# Patient Record
Sex: FEMALE | Race: WHITE | NOT HISPANIC OR LATINO | Employment: OTHER | URBAN - NONMETROPOLITAN AREA
[De-identification: names, ages, dates, MRNs, and addresses within clinical notes are randomized per-mention and may not be internally consistent; named-entity substitution may affect disease eponyms.]

---

## 2017-05-15 ENCOUNTER — OFFICE VISIT (OUTPATIENT)
Dept: PULMONOLOGY | Facility: CLINIC | Age: 52
End: 2017-05-15

## 2017-05-15 VITALS
RESPIRATION RATE: 18 BRPM | WEIGHT: 250.2 LBS | HEART RATE: 85 BPM | OXYGEN SATURATION: 96 % | HEIGHT: 66 IN | SYSTOLIC BLOOD PRESSURE: 120 MMHG | DIASTOLIC BLOOD PRESSURE: 76 MMHG | BODY MASS INDEX: 40.21 KG/M2

## 2017-05-15 DIAGNOSIS — G47.33 OBSTRUCTIVE SLEEP APNEA: Primary | ICD-10-CM

## 2017-05-15 DIAGNOSIS — G47.19 EXCESSIVE DAYTIME SLEEPINESS: ICD-10-CM

## 2017-05-15 DIAGNOSIS — E66.9 OBESITY (BMI 30-39.9): ICD-10-CM

## 2017-05-15 DIAGNOSIS — R06.83 SNORING: ICD-10-CM

## 2017-05-15 PROCEDURE — 99244 OFF/OP CNSLTJ NEW/EST MOD 40: CPT | Performed by: INTERNAL MEDICINE

## 2017-05-15 RX ORDER — FLUTICASONE PROPIONATE 50 MCG
SPRAY, SUSPENSION (ML) NASAL
Refills: 2 | COMMUNITY
Start: 2017-05-09

## 2017-05-15 RX ORDER — TRIAMTERENE AND HYDROCHLOROTHIAZIDE 37.5; 25 MG/1; MG/1
CAPSULE ORAL
Refills: 3 | COMMUNITY
Start: 2017-03-01 | End: 2022-02-16

## 2017-05-15 RX ORDER — TRAZODONE HYDROCHLORIDE 100 MG/1
TABLET ORAL
Refills: 4 | COMMUNITY
Start: 2017-05-09 | End: 2022-11-01

## 2017-05-15 RX ORDER — MELOXICAM 15 MG/1
15 TABLET ORAL DAILY
COMMUNITY

## 2017-05-15 RX ORDER — RANITIDINE 150 MG/1
TABLET ORAL
Refills: 4 | COMMUNITY
Start: 2017-04-28 | End: 2022-02-16 | Stop reason: ALTCHOICE

## 2017-05-15 RX ORDER — CYCLOBENZAPRINE HCL 10 MG
10 TABLET ORAL 3 TIMES DAILY PRN
COMMUNITY
End: 2023-03-10

## 2017-05-15 RX ORDER — CETIRIZINE HYDROCHLORIDE 10 MG/1
TABLET ORAL
Refills: 2 | COMMUNITY
Start: 2017-05-11

## 2017-05-15 RX ORDER — AMOXICILLIN AND CLAVULANATE POTASSIUM 875; 125 MG/1; MG/1
TABLET, FILM COATED ORAL
Refills: 0 | COMMUNITY
Start: 2017-05-10 | End: 2017-06-21

## 2017-05-15 RX ORDER — PROPRANOLOL HYDROCHLORIDE 40 MG/1
40 TABLET ORAL 2 TIMES DAILY
COMMUNITY
Start: 2017-05-11 | End: 2022-02-16

## 2017-05-15 RX ORDER — FLUOXETINE HYDROCHLORIDE 40 MG/1
40 CAPSULE ORAL DAILY
COMMUNITY
End: 2023-03-10 | Stop reason: SDUPTHER

## 2017-05-16 ENCOUNTER — TRANSCRIBE ORDERS (OUTPATIENT)
Dept: GENERAL RADIOLOGY | Facility: HOSPITAL | Age: 52
End: 2017-05-16

## 2017-05-16 ENCOUNTER — APPOINTMENT (OUTPATIENT)
Dept: GENERAL RADIOLOGY | Facility: HOSPITAL | Age: 52
End: 2017-05-16

## 2017-05-16 ENCOUNTER — TELEPHONE (OUTPATIENT)
Dept: SURGERY | Facility: CLINIC | Age: 52
End: 2017-05-16

## 2017-05-16 DIAGNOSIS — J45.909 ACUTE ASTHMA: Primary | ICD-10-CM

## 2017-05-18 ENCOUNTER — PREP FOR SURGERY (OUTPATIENT)
Dept: SURGERY | Facility: CLINIC | Age: 52
End: 2017-05-18

## 2017-05-18 DIAGNOSIS — Z12.11 ENCOUNTER FOR SCREENING COLONOSCOPY: Primary | ICD-10-CM

## 2017-05-18 RX ORDER — SODIUM CHLORIDE 9 MG/ML
30 INJECTION, SOLUTION INTRAVENOUS CONTINUOUS PRN
Status: CANCELLED | OUTPATIENT
Start: 2017-05-18

## 2017-06-06 ENCOUNTER — APPOINTMENT (OUTPATIENT)
Dept: SLEEP MEDICINE | Facility: HOSPITAL | Age: 52
End: 2017-06-06
Attending: INTERNAL MEDICINE

## 2017-06-13 ENCOUNTER — HOSPITAL ENCOUNTER (OUTPATIENT)
Dept: SLEEP MEDICINE | Facility: HOSPITAL | Age: 52
Discharge: HOME | End: 2017-06-13
Attending: INTERNAL MEDICINE | Admitting: INTERNAL MEDICINE

## 2017-06-13 DIAGNOSIS — G47.19 EXCESSIVE DAYTIME SLEEPINESS: ICD-10-CM

## 2017-06-13 DIAGNOSIS — G47.33 OBSTRUCTIVE SLEEP APNEA: ICD-10-CM

## 2017-06-13 DIAGNOSIS — R06.83 SNORING: ICD-10-CM

## 2017-06-13 PROCEDURE — 95810 POLYSOM 6/> YRS 4/> PARAM: CPT | Performed by: INTERNAL MEDICINE

## 2017-06-13 PROCEDURE — 95810 POLYSOM 6/> YRS 4/> PARAM: CPT

## 2017-06-16 DIAGNOSIS — G47.33 OSA (OBSTRUCTIVE SLEEP APNEA): Primary | ICD-10-CM

## 2017-06-28 ENCOUNTER — HOSPITAL ENCOUNTER (OUTPATIENT)
Facility: HOSPITAL | Age: 52
Setting detail: HOSPITAL OUTPATIENT SURGERY
Discharge: HOME | End: 2017-06-28
Attending: SURGERY | Admitting: SURGERY

## 2017-06-28 ENCOUNTER — ANESTHESIA EVENT (OUTPATIENT)
Dept: GASTROENTEROLOGY | Facility: HOSPITAL | Age: 52
End: 2017-06-28

## 2017-06-28 ENCOUNTER — ANESTHESIA (OUTPATIENT)
Dept: GASTROENTEROLOGY | Facility: HOSPITAL | Age: 52
End: 2017-06-28

## 2017-06-28 VITALS
RESPIRATION RATE: 18 BRPM | TEMPERATURE: 98.4 F | BODY MASS INDEX: 40.18 KG/M2 | OXYGEN SATURATION: 98 % | HEART RATE: 62 BPM | SYSTOLIC BLOOD PRESSURE: 149 MMHG | HEIGHT: 66 IN | WEIGHT: 250 LBS | DIASTOLIC BLOOD PRESSURE: 67 MMHG

## 2017-06-28 DIAGNOSIS — Z12.11 ENCOUNTER FOR SCREENING COLONOSCOPY: ICD-10-CM

## 2017-06-28 LAB — GLUCOSE BLDC GLUCOMTR-MCNC: 141 MG/DL (ref 70–130)

## 2017-06-28 PROCEDURE — S0260 H&P FOR SURGERY: HCPCS | Performed by: SURGERY

## 2017-06-28 PROCEDURE — 25010000002 PROPOFOL 10 MG/ML EMULSION: Performed by: NURSE ANESTHETIST, CERTIFIED REGISTERED

## 2017-06-28 PROCEDURE — 25010000002 PROPOFOL 1000 MG/ML EMULSION: Performed by: NURSE ANESTHETIST, CERTIFIED REGISTERED

## 2017-06-28 PROCEDURE — 82962 GLUCOSE BLOOD TEST: CPT

## 2017-06-28 RX ORDER — PROPOFOL 10 MG/ML
VIAL (ML) INTRAVENOUS AS NEEDED
Status: DISCONTINUED | OUTPATIENT
Start: 2017-06-28 | End: 2017-06-28 | Stop reason: SURG

## 2017-06-28 RX ORDER — KETAMINE HYDROCHLORIDE 50 MG/ML
INJECTION, SOLUTION, CONCENTRATE INTRAMUSCULAR; INTRAVENOUS AS NEEDED
Status: DISCONTINUED | OUTPATIENT
Start: 2017-06-28 | End: 2017-06-28 | Stop reason: SURG

## 2017-06-28 RX ORDER — LIDOCAINE HYDROCHLORIDE 20 MG/ML
INJECTION, SOLUTION INFILTRATION; PERINEURAL AS NEEDED
Status: DISCONTINUED | OUTPATIENT
Start: 2017-06-28 | End: 2017-06-28 | Stop reason: SURG

## 2017-06-28 RX ORDER — SODIUM CHLORIDE 9 MG/ML
30 INJECTION, SOLUTION INTRAVENOUS CONTINUOUS PRN
Status: DISCONTINUED | OUTPATIENT
Start: 2017-06-28 | End: 2017-06-28 | Stop reason: HOSPADM

## 2017-06-28 RX ORDER — MONTELUKAST SODIUM 10 MG/1
10 TABLET ORAL NIGHTLY
COMMUNITY

## 2017-06-28 RX ORDER — ONDANSETRON 2 MG/ML
4 INJECTION INTRAMUSCULAR; INTRAVENOUS ONCE AS NEEDED
Status: DISCONTINUED | OUTPATIENT
Start: 2017-06-28 | End: 2017-06-28 | Stop reason: HOSPADM

## 2017-06-28 RX ADMIN — KETAMINE HYDROCHLORIDE 25 MG: 50 INJECTION, SOLUTION INTRAMUSCULAR; INTRAVENOUS at 08:07

## 2017-06-28 RX ADMIN — SODIUM CHLORIDE 30 ML/HR: 9 INJECTION, SOLUTION INTRAVENOUS at 07:02

## 2017-06-28 RX ADMIN — PROPOFOL 150 MG: 10 INJECTION, EMULSION INTRAVENOUS at 08:10

## 2017-06-28 RX ADMIN — PROPOFOL 100 MCG/KG/MIN: 10 INJECTION, EMULSION INTRAVENOUS at 08:10

## 2017-06-28 RX ADMIN — LIDOCAINE HYDROCHLORIDE 2 ML: 20 INJECTION, SOLUTION INFILTRATION; PERINEURAL at 08:07

## 2017-06-28 RX ADMIN — SODIUM CHLORIDE: 9 INJECTION, SOLUTION INTRAVENOUS at 08:04

## 2017-06-28 NOTE — ANESTHESIA PREPROCEDURE EVALUATION
Anesthesia Evaluation     Patient summary reviewed and Nursing notes reviewed   NPO Solid Status: > 8 hours  NPO Liquid Status: > 8 hours     Airway   Mallampati: I  TM distance: >3 FB  Neck ROM: full  no difficulty expected  Dental      Pulmonary - normal exam   (+) sleep apnea,   (-) asthma  Cardiovascular - normal exam    (+) hypertension,       Neuro/Psych  (+) psychiatric history Depression,    GI/Hepatic/Renal/Endo    (+) morbid obesity, GERD, diabetes mellitus type 2,     Musculoskeletal     Abdominal   (+) obese,    Substance History      OB/GYN          Other                                        Anesthesia Plan    ASA 3     MAC     intravenous induction   Anesthetic plan and risks discussed with patient.

## 2017-06-28 NOTE — ANESTHESIA POSTPROCEDURE EVALUATION
Patient: Ana Samaniego    Procedure Summary     Date Anesthesia Start Anesthesia Stop Room / Location    06/28/17 0804 [id number] [facility] ENDOSCOPY 3 / [facility] ENDOSCOPY       Procedure Diagnosis Surgeon Provider    COLONOSCOPY (N/A Anus) Encounter for screening colonoscopy  (Encounter for screening colonoscopy [Z12.11]) MD Darrick Villalta CRNA          Anesthesia Type: MAC  Last vitals  BP      Temp      Pulse     Resp      SpO2        Post Anesthesia Care and Evaluation    Patient location during evaluation: PACU  Patient participation: complete - patient participated  Level of consciousness: awake and alert  Pain score: 0  Pain management: satisfactory to patient  Airway patency: patent  Anesthetic complications: No anesthetic complications  PONV Status: none  Cardiovascular status: acceptable and hemodynamically stable  Respiratory status: acceptable  Hydration status: acceptable

## 2018-04-03 ENCOUNTER — TRANSCRIBE ORDERS (OUTPATIENT)
Dept: ADMINISTRATIVE | Facility: HOSPITAL | Age: 53
End: 2018-04-03

## 2018-04-03 DIAGNOSIS — Z12.39 SCREENING BREAST EXAMINATION: Primary | ICD-10-CM

## 2018-05-07 ENCOUNTER — HOSPITAL ENCOUNTER (OUTPATIENT)
Dept: MAMMOGRAPHY | Facility: HOSPITAL | Age: 53
Discharge: HOME | End: 2018-05-07
Admitting: PHYSICIAN ASSISTANT

## 2018-05-07 ENCOUNTER — TELEPHONE (OUTPATIENT)
Dept: MAMMOGRAPHY | Facility: HOSPITAL | Age: 53
End: 2018-05-07

## 2018-05-07 DIAGNOSIS — Z12.39 SCREENING BREAST EXAMINATION: ICD-10-CM

## 2018-05-07 PROCEDURE — 77067 SCR MAMMO BI INCL CAD: CPT

## 2018-05-07 PROCEDURE — 77063 BREAST TOMOSYNTHESIS BI: CPT

## 2019-08-13 ENCOUNTER — TRANSCRIBE ORDERS (OUTPATIENT)
Dept: ADMINISTRATIVE | Facility: HOSPITAL | Age: 54
End: 2019-08-13

## 2019-08-13 DIAGNOSIS — Z12.39 ENCOUNTER FOR SCREENING FOR MALIGNANT NEOPLASM OF BREAST: Primary | ICD-10-CM

## 2019-08-30 ENCOUNTER — HOSPITAL ENCOUNTER (OUTPATIENT)
Dept: MAMMOGRAPHY | Facility: HOSPITAL | Age: 54
Discharge: HOME | End: 2019-08-30
Admitting: PHYSICIAN ASSISTANT

## 2019-08-30 DIAGNOSIS — Z12.39 ENCOUNTER FOR SCREENING FOR MALIGNANT NEOPLASM OF BREAST: ICD-10-CM

## 2019-08-30 PROCEDURE — 77063 BREAST TOMOSYNTHESIS BI: CPT

## 2019-08-30 PROCEDURE — 77067 SCR MAMMO BI INCL CAD: CPT

## 2020-01-08 DIAGNOSIS — M25.562 ARTHRALGIA OF LEFT KNEE: Primary | ICD-10-CM

## 2020-01-09 ENCOUNTER — OFFICE VISIT (OUTPATIENT)
Dept: ORTHOPEDIC SURGERY | Facility: CLINIC | Age: 55
End: 2020-01-09

## 2020-01-09 VITALS — HEIGHT: 66 IN | RESPIRATION RATE: 18 BRPM | BODY MASS INDEX: 40.18 KG/M2 | WEIGHT: 250 LBS

## 2020-01-09 DIAGNOSIS — M17.10 ARTHRITIS OF KNEE: Primary | ICD-10-CM

## 2020-01-09 PROCEDURE — 20610 DRAIN/INJ JOINT/BURSA W/O US: CPT | Performed by: ORTHOPAEDIC SURGERY

## 2020-01-09 PROCEDURE — 99204 OFFICE O/P NEW MOD 45 MIN: CPT | Performed by: ORTHOPAEDIC SURGERY

## 2020-01-09 RX ORDER — TRIAMCINOLONE ACETONIDE 40 MG/ML
40 INJECTION, SUSPENSION INTRA-ARTICULAR; INTRAMUSCULAR
Status: COMPLETED | OUTPATIENT
Start: 2020-01-09 | End: 2020-01-09

## 2020-01-09 RX ORDER — LIDOCAINE HYDROCHLORIDE 10 MG/ML
2 INJECTION, SOLUTION INFILTRATION; PERINEURAL
Status: COMPLETED | OUTPATIENT
Start: 2020-01-09 | End: 2020-01-09

## 2020-01-09 RX ADMIN — LIDOCAINE HYDROCHLORIDE 2 ML: 10 INJECTION, SOLUTION INFILTRATION; PERINEURAL at 08:54

## 2020-01-09 RX ADMIN — TRIAMCINOLONE ACETONIDE 40 MG: 40 INJECTION, SUSPENSION INTRA-ARTICULAR; INTRAMUSCULAR at 08:54

## 2020-01-09 NOTE — PROGRESS NOTES
Subjective   Patient ID: Ana Samaniego is a 54 y.o. female  Pain of the Left Knee (Patient is here today for left knee pain, she states the pain has been going on for about 3 months, and states her pcp gave her a cortisone injection in her hip.)             History of Present Illness  54-year-old with chronic medial left knee pain worse the last month no fall or injury, minimally improved with ibuprofen, no history of surgery or injections to the left knee, had injection in her left hip a month ago which relieved her left hip pain.  Does care for 3 grandchildren at home frequently walks up and down stairs otherwise no locking giving way or other significant acute medical illness.      Review of Systems   Constitutional: Negative for fever.   HENT: Negative for dental problem and voice change.    Eyes: Negative for visual disturbance.   Respiratory: Negative for shortness of breath.    Cardiovascular: Negative for chest pain.   Gastrointestinal: Negative for abdominal pain.   Genitourinary: Negative for dysuria.   Musculoskeletal: Positive for arthralgias. Negative for gait problem and joint swelling.   Skin: Negative for rash.   Neurological: Negative for speech difficulty.   Hematological: Does not bruise/bleed easily.   Psychiatric/Behavioral: Negative for confusion.       Past Medical History:   Diagnosis Date   • Acid reflux    • Asthma, extrinsic    • Bronchitis    • Depression    • Diabetes (CMS/HCC)    • GERD (gastroesophageal reflux disease)    • Hypertension    • Pleurisy         Past Surgical History:   Procedure Laterality Date   • COLONOSCOPY N/A 6/28/2017    Procedure: COLONOSCOPY;  Surgeon: Abdi Del Castillo MD;  Location: [facility] ENDOSCOPY;  Service:    • GALLBLADDER SURGERY     • HYSTERECTOMY     • RECTOVAGINAL FISTULA REPAIR     • TUBAL ABDOMINAL LIGATION     • VEIN LIGATION AND STRIPPING Right        Family History   Problem Relation Age of Onset   • Heart attack Mother    • Emphysema Father         Social History     Socioeconomic History   • Marital status:      Spouse name: Not on file   • Number of children: Not on file   • Years of education: Not on file   • Highest education level: Not on file   Tobacco Use   • Smoking status: Former Smoker     Packs/day: 1.00     Years: 20.00     Pack years: 20.00     Types: Cigarettes     Last attempt to quit: 2012     Years since quittin.5   • Smokeless tobacco: Never Used   Substance and Sexual Activity   • Alcohol use: No   • Drug use: No   • Sexual activity: Defer       I have reviewed the above medical and surgical history, family history, social history, medications, allergies and review of systems.    No Known Allergies      Current Outpatient Medications:   •  Calcium Carbonate-Vitamin D (CALCIUM PLUS VITAMIN D PO), Take 1 tablet by mouth Daily., Disp: , Rfl:   •  cetirizine (zyrTEC) 10 MG tablet, TAKE 1 TABLET BY MOUTH ONE TIME A DAY, Disp: , Rfl: 2  •  cyclobenzaprine (FLEXERIL) 10 MG tablet, Take 10 mg by mouth 3 (Three) Times a Day As Needed for Muscle Spasms., Disp: , Rfl:   •  FLUoxetine (PROzac) 40 MG capsule, Take 40 mg by mouth Daily., Disp: , Rfl:   •  fluticasone (FLONASE) 50 MCG/ACT nasal spray, INHALE 1 SPRAY IN EACH NOSTRIL ONE TIME A DAY-USES PRN, Disp: , Rfl: 2  •  meloxicam (MOBIC) 15 MG tablet, Take 15 mg by mouth Daily., Disp: , Rfl:   •  montelukast (SINGULAIR) 10 MG tablet, Take 10 mg by mouth Every Night., Disp: , Rfl:   •  PROAIR  (90 BASE) MCG/ACT inhaler, INHALE 2 PUFFS BY MOUTH FOUR TIMES A DAY AS NEEDED, Disp: , Rfl: 2  •  propranolol (INDERAL) 40 MG tablet, 40 mg 2 (Two) Times a Day., Disp: , Rfl:   •  raNITIdine (ZANTAC) 150 MG tablet, TAKE 1 TABLET BY MOUTH ONE TIME A DAY, Disp: , Rfl: 4  •  traZODone (DESYREL) 100 MG tablet, TAKE 1 TABLET BY MOUTH AT BEDTIME, Disp: , Rfl: 4  •  triamterene-hydrochlorothiazide (DYAZIDE) 37.5-25 MG per capsule, TAKE 1 CAPSULE BY MOUTH ONE TIME A DAY, Disp: , Rfl:  "3    Objective   Resp 18   Ht 167.6 cm (66\")   Wt 113 kg (250 lb)   BMI 40.35 kg/m²    Physical Exam  Constitutional: Patient is oriented to person, place, and time. Patient appears well-developed and well-nourished.   HENT:Head: Normocephalic and atraumatic.   Eyes: EOM are normal. Pupils are equal, round, and reactive to light.   Neck: Normal range of motion. Neck supple.   Cardiovascular: Normal rate.    Pulmonary/Chest: Effort normal and breath sounds normal.   Abdominal: Soft.   Neurological: Patient is alert and oriented to person, place, and time.   Skin: Skin is warm and dry.   Psychiatric: Patient has a normal mood and affect.   Nursing note and vitals reviewed.       [unfilled]   Left knee: Point tenderness over the anteromedial joint line no erythema warmth or significant instability, full extension minimal patellofemoral crepitus no lateral joint line pain calf supple neurovascularly intact.  Alignment neutral full range of motion Abad sign negative for mechanical posterior medial joint line pain.    Assessment/Plan   Review of Radiographic Studies:    Indication to evaluate joint condition, no comparison views available, shows evident chronic advanced osteoarthritis.      Large Joint Arthrocentesis: L knee  Date/Time: 1/9/2020 8:54 AM  Consent given by: patient  Site marked: site marked  Timeout: Immediately prior to procedure a time out was called to verify the correct patient, procedure, equipment, support staff and site/side marked as required   Supporting Documentation  Indications: pain   Procedure Details  Location: knee - L knee  Preparation: Patient was prepped and draped in the usual sterile fashion  Needle size: 22 G  Approach: anterolateral  Medications administered: 40 mg triamcinolone acetonide 40 MG/ML; 2 mL lidocaine 1 %  Patient tolerance: patient tolerated the procedure well with no immediate complications           Ana was seen today for pain.    Diagnoses and all orders for " this visit:    Arthritis of knee  -     Large Joint Arthrocentesis: L knee       Orthopedic activities reviewed and patient expressed appreciation, Risk, benefits, and merits of treatment alternatives reviewed with the patient and questions answered and Using illustrations and models, the nature of the pathology was explained to the patient      Recommendations/Plan:   Work/Activity Status: May perform usual activities as tolerated    Patient agreeable to call or return sooner for any concerns.         I reviewed the patient's radiographs indicating advanced osteoarthritis discussed the natural history treatment options and pros and cons and risks and complications of surgical and nonsurgical care.  I also reviewed advantages and disadvantages risks and complications of steroid injections versus viscus gel injections.  The patient had opportunity to ask questions which were answered.    Impression:  Left knee osteoarthritis  Plan:  Weight loss home exercise return for further injections as needed continue with ibuprofen with PCP direction

## 2021-12-08 ENCOUNTER — TRANSCRIBE ORDERS (OUTPATIENT)
Dept: ADMINISTRATIVE | Facility: HOSPITAL | Age: 56
End: 2021-12-08

## 2021-12-08 DIAGNOSIS — Z13.820 ENCOUNTER FOR SCREENING FOR OSTEOPOROSIS: Primary | ICD-10-CM

## 2021-12-08 DIAGNOSIS — Z12.31 ENCOUNTER FOR SCREENING MAMMOGRAM FOR MALIGNANT NEOPLASM OF BREAST: ICD-10-CM

## 2021-12-08 DIAGNOSIS — Z12.31 ENCOUNTER FOR SCREENING MAMMOGRAM FOR MALIGNANT NEOPLASM OF BREAST: Primary | ICD-10-CM

## 2022-02-01 ENCOUNTER — APPOINTMENT (OUTPATIENT)
Dept: BONE DENSITY | Facility: HOSPITAL | Age: 57
End: 2022-02-01

## 2022-02-01 ENCOUNTER — HOSPITAL ENCOUNTER (OUTPATIENT)
Dept: MAMMOGRAPHY | Facility: HOSPITAL | Age: 57
Discharge: HOME | End: 2022-02-01

## 2022-02-01 DIAGNOSIS — Z13.820 ENCOUNTER FOR SCREENING FOR OSTEOPOROSIS: ICD-10-CM

## 2022-02-01 DIAGNOSIS — Z12.31 ENCOUNTER FOR SCREENING MAMMOGRAM FOR MALIGNANT NEOPLASM OF BREAST: ICD-10-CM

## 2022-02-01 PROCEDURE — 77067 SCR MAMMO BI INCL CAD: CPT

## 2022-02-01 PROCEDURE — 77063 BREAST TOMOSYNTHESIS BI: CPT

## 2022-02-01 PROCEDURE — 77080 DXA BONE DENSITY AXIAL: CPT

## 2022-02-16 ENCOUNTER — OFFICE VISIT (OUTPATIENT)
Dept: OBSTETRICS AND GYNECOLOGY | Facility: CLINIC | Age: 57
End: 2022-02-16

## 2022-02-16 VITALS
SYSTOLIC BLOOD PRESSURE: 128 MMHG | WEIGHT: 258 LBS | BODY MASS INDEX: 41.46 KG/M2 | DIASTOLIC BLOOD PRESSURE: 80 MMHG | HEIGHT: 66 IN

## 2022-02-16 DIAGNOSIS — Z01.419 SMEAR, VAGINAL, AS PART OF ROUTINE GYNECOLOGICAL EXAMINATION: ICD-10-CM

## 2022-02-16 DIAGNOSIS — Z12.72 SMEAR, VAGINAL, AS PART OF ROUTINE GYNECOLOGICAL EXAMINATION: ICD-10-CM

## 2022-02-16 DIAGNOSIS — Z01.419 ENCOUNTER FOR GYNECOLOGICAL EXAMINATION WITHOUT ABNORMAL FINDING: Primary | ICD-10-CM

## 2022-02-16 PROCEDURE — 99386 PREV VISIT NEW AGE 40-64: CPT | Performed by: PHYSICIAN ASSISTANT

## 2022-02-16 RX ORDER — AMLODIPINE BESYLATE 10 MG/1
TABLET ORAL
COMMUNITY
Start: 2022-02-15

## 2022-02-16 RX ORDER — DICLOFENAC SODIUM 75 MG/1
75 TABLET, DELAYED RELEASE ORAL 2 TIMES DAILY
COMMUNITY
Start: 2022-02-06

## 2022-02-16 RX ORDER — TRAZODONE HYDROCHLORIDE 150 MG/1
150 TABLET ORAL
COMMUNITY
Start: 2021-12-16

## 2022-02-16 RX ORDER — CLONAZEPAM 0.5 MG/1
0.5 TABLET ORAL 2 TIMES DAILY
COMMUNITY
Start: 2022-01-19 | End: 2023-01-13 | Stop reason: SDUPTHER

## 2022-02-16 RX ORDER — METHOCARBAMOL 750 MG/1
750 TABLET, FILM COATED ORAL NIGHTLY
COMMUNITY
Start: 2022-02-06

## 2022-02-16 RX ORDER — OMEPRAZOLE 20 MG/1
20 CAPSULE, DELAYED RELEASE ORAL EVERY MORNING
COMMUNITY
Start: 2022-01-18

## 2022-02-16 NOTE — PROGRESS NOTES
Subjective   Chief Complaint   Patient presents with   • Gynecologic Exam     Last pap done in 2019-WNL, MMG and DEXA done on 22, No complaints       Ana Samaniego is a 56 y.o. year old  presenting to be seen for her annual gynecological exam.   She has no complaints or concerns  She had a previous hysterectomy by Dr. Stark several years ago for bleeding issues.  She has both ovaries remaining  She had a normal screening mammogram in February as well as a normal DEXA.  He is taking calcium with vitamin D supplement daily    Past Medical History:   Diagnosis Date   • Abnormal Pap smear of cervix    • Acid reflux    • Anxiety    • Asthma, extrinsic    • Bronchitis    • Depression    • Diabetes (HCC)    • GERD (gastroesophageal reflux disease)    • Gestational diabetes    • Gestational hypertension    • HPV (human papilloma virus) infection    • Hypertension    • Pleurisy         Current Outpatient Medications:   •  amLODIPine (NORVASC) 10 MG tablet, , Disp: , Rfl:   •  Calcium Carbonate-Vitamin D (CALCIUM PLUS VITAMIN D PO), Take 1 tablet by mouth Daily., Disp: , Rfl:   •  cetirizine (zyrTEC) 10 MG tablet, TAKE 1 TABLET BY MOUTH ONE TIME A DAY, Disp: , Rfl: 2  •  clonazePAM (KlonoPIN) 0.5 MG tablet, Take 0.5 mg by mouth 2 (Two) Times a Day., Disp: , Rfl:   •  cyclobenzaprine (FLEXERIL) 10 MG tablet, Take 10 mg by mouth 3 (Three) Times a Day As Needed for Muscle Spasms., Disp: , Rfl:   •  diclofenac (VOLTAREN) 75 MG EC tablet, Take 75 mg by mouth 2 (Two) Times a Day., Disp: , Rfl:   •  FLUoxetine (PROzac) 40 MG capsule, Take 40 mg by mouth Daily., Disp: , Rfl:   •  fluticasone (FLONASE) 50 MCG/ACT nasal spray, INHALE 1 SPRAY IN EACH NOSTRIL ONE TIME A DAY-USES PRN, Disp: , Rfl: 2  •  meloxicam (MOBIC) 15 MG tablet, Take 15 mg by mouth Daily., Disp: , Rfl:   •  methocarbamol (ROBAXIN) 750 MG tablet, Take 750 mg by mouth Every Night., Disp: , Rfl:   •  montelukast (SINGULAIR) 10 MG tablet, Take 10 mg by  "mouth Every Night., Disp: , Rfl:   •  omeprazole (priLOSEC) 20 MG capsule, Take 20 mg by mouth Every Morning., Disp: , Rfl:   •  PROAIR  (90 BASE) MCG/ACT inhaler, INHALE 2 PUFFS BY MOUTH FOUR TIMES A DAY AS NEEDED, Disp: , Rfl: 2  •  traZODone (DESYREL) 100 MG tablet, TAKE 1 TABLET BY MOUTH AT BEDTIME, Disp: , Rfl: 4  •  traZODone (DESYREL) 150 MG tablet, Take 150 mg by mouth every night at bedtime., Disp: , Rfl:    No Known Allergies   Past Surgical History:   Procedure Laterality Date   • COLONOSCOPY N/A 2017    Procedure: COLONOSCOPY;  Surgeon: Abdi Del Castillo MD;  Location: [facility] ENDOSCOPY;  Service:    • GALLBLADDER SURGERY     • HYSTERECTOMY     • RECTOVAGINAL FISTULA REPAIR     • TUBAL ABDOMINAL LIGATION     • VEIN LIGATION AND STRIPPING Right       Social History     Socioeconomic History   • Marital status:    Tobacco Use   • Smoking status: Former Smoker     Packs/day: 1.00     Years: 20.00     Pack years: 20.00     Types: Cigarettes     Quit date: 2012     Years since quittin.6   • Smokeless tobacco: Never Used   Vaping Use   • Vaping Use: Never used   Substance and Sexual Activity   • Alcohol use: No   • Drug use: No   • Sexual activity: Not Currently     Partners: Male     Birth control/protection: Surgical      Family History   Problem Relation Age of Onset   • Heart attack Mother    • Emphysema Father        Review of Systems   Constitutional: Negative for chills, diaphoresis and fever.   Gastrointestinal: Negative.    Genitourinary: Negative for difficulty urinating, dysuria and pelvic pain.   Psychiatric/Behavioral: Positive for dysphoric mood.           Objective   /80   Ht 167.6 cm (66\")   Wt 117 kg (258 lb)   Breastfeeding No   BMI 41.64 kg/m²     Physical Exam  Constitutional:       Appearance: Normal appearance. She is well-developed and well-groomed.   Eyes:      General: Lids are normal.      Extraocular Movements: Extraocular movements intact.      " Conjunctiva/sclera: Conjunctivae normal.   Chest:   Breasts: Breasts are symmetrical.      Right: No inverted nipple, mass, nipple discharge, skin change, tenderness or axillary adenopathy.      Left: No inverted nipple, mass, nipple discharge, skin change, tenderness or axillary adenopathy.       Abdominal:      Palpations: Abdomen is soft.      Tenderness: There is no abdominal tenderness.   Genitourinary:     Labia:         Right: No rash, tenderness or lesion.         Left: No rash, tenderness or lesion.       Urethra: No prolapse, urethral pain, urethral swelling or urethral lesion.      Vagina: No vaginal discharge, tenderness or lesions.      Uterus: Absent.       Adnexa:         Right: No mass or tenderness.          Left: No mass or tenderness.     Lymphadenopathy:      Upper Body:      Right upper body: No axillary adenopathy.      Left upper body: No axillary adenopathy.   Skin:     General: Skin is warm and dry.      Findings: No bruising or lesion.   Neurological:      Mental Status: She is alert.   Psychiatric:         Attention and Perception: Attention normal.         Mood and Affect: Mood normal.         Speech: Speech normal.         Behavior: Behavior is cooperative.            Result Review :                   Assessment and Plan  Diagnoses and all orders for this visit:    1. Encounter for gynecological examination without abnormal finding (Primary)    2. Smear, vaginal, as part of routine gynecological examination  -     Pap IG, HPV-hr; Future      Patient Instructions   Self breast exam monthly  Annual mammogram  Calcium 1200 mg daily and vit D 2000 mg daily  Regular excercise               This note was electronically signed.    Sailaja Heller PA-C   February 16, 2022

## 2022-03-04 DIAGNOSIS — Z01.419 SMEAR, VAGINAL, AS PART OF ROUTINE GYNECOLOGICAL EXAMINATION: ICD-10-CM

## 2022-03-04 DIAGNOSIS — Z12.72 SMEAR, VAGINAL, AS PART OF ROUTINE GYNECOLOGICAL EXAMINATION: ICD-10-CM

## 2022-11-01 ENCOUNTER — OFFICE VISIT (OUTPATIENT)
Dept: PSYCHIATRY | Facility: CLINIC | Age: 57
End: 2022-11-01

## 2022-11-01 VITALS
WEIGHT: 235 LBS | SYSTOLIC BLOOD PRESSURE: 112 MMHG | BODY MASS INDEX: 37.77 KG/M2 | DIASTOLIC BLOOD PRESSURE: 72 MMHG | HEIGHT: 66 IN

## 2022-11-01 DIAGNOSIS — F33.1 MODERATE EPISODE OF RECURRENT MAJOR DEPRESSIVE DISORDER: Primary | ICD-10-CM

## 2022-11-01 DIAGNOSIS — F41.1 GENERALIZED ANXIETY DISORDER: ICD-10-CM

## 2022-11-01 DIAGNOSIS — F41.0 PANIC ATTACKS: ICD-10-CM

## 2022-11-01 PROCEDURE — 90792 PSYCH DIAG EVAL W/MED SRVCS: CPT | Performed by: NURSE PRACTITIONER

## 2022-11-01 RX ORDER — SITAGLIPTIN AND METFORMIN HYDROCHLORIDE 50; 500 MG/1; MG/1
1 TABLET, FILM COATED, EXTENDED RELEASE ORAL DAILY
COMMUNITY

## 2022-11-01 RX ORDER — HYDROXYZINE HYDROCHLORIDE 25 MG/1
25 TABLET, FILM COATED ORAL 3 TIMES DAILY PRN
COMMUNITY

## 2022-11-01 RX ORDER — LORATADINE 10 MG/1
10 CAPSULE, LIQUID FILLED ORAL
COMMUNITY

## 2022-11-01 RX ORDER — ARIPIPRAZOLE 5 MG/1
5 TABLET ORAL DAILY
Qty: 30 TABLET | Refills: 1 | Status: SHIPPED | OUTPATIENT
Start: 2022-11-01 | End: 2022-12-13 | Stop reason: SDUPTHER

## 2022-11-01 RX ORDER — DULOXETIN HYDROCHLORIDE 30 MG/1
30 CAPSULE, DELAYED RELEASE ORAL DAILY
COMMUNITY
End: 2022-12-13 | Stop reason: ALTCHOICE

## 2022-11-01 RX ORDER — ARIPIPRAZOLE 5 MG/1
5 TABLET ORAL DAILY
Qty: 30 TABLET | Refills: 1 | Status: SHIPPED | OUTPATIENT
Start: 2022-11-01 | End: 2022-11-01

## 2022-11-01 RX ORDER — CALCIUM CARBONATE 200(500)MG
1 TABLET,CHEWABLE ORAL DAILY
COMMUNITY

## 2022-11-01 NOTE — PROGRESS NOTES
Subjective   Ana Samaniego is a 57 y.o. female who presents today for initial evaluation     Chief Complaint: Depression, anxiety and panic attacks    History of Present Illness:   History of Present Illness  Ana Samaniego presents to [facility] for initial evaluation.  Reports history of anxiety and depression that has been present for several years.  Symptoms have most recently been treated by PCP, Terese Camacho PA-C.  Admits that symptoms have become progressively worse over the past few months.  Reports recent episode of severe anxiety, with increased frequency and severity of panic attacks as well.  Reports depressive symptoms that include very little to no motivation, no interest in activities, fatigue, poor appetite, poor concentration and poor sleep.  Says that anxiety is constant, often worries and feels on edge.  Panic attacks occur multiple times a week, sometimes daily or multiple times per day.  Symptoms with panic episode include chest pressure/tightness, SOA and fidgetiness.  Reports most recent incident of extreme anxiety occurred after being out of clonazepam x5 days.  Says that she typically likes to keep her house tidy and in order, but has most recently found it difficult to even wash dishes, decorate for holidays or do any of her daily house chores.  Trazodone was recently increased by PCP to help with sleep.  Currently obtaining about 8 hours of sleep per night.  Reports appetite has been decreased due medication (Trulicity).  Says that this medication was recently stopped and hopes to start another type of injection for diabetes.  Adamantly denies any current SI/HI or AV hallucinations. PHQ-9 total score: 12, ES-7 total score: 14    Past Psychiatric History: Reports history of anxiety and depression that was initially diagnosed around 2012.  Admits past suicide attempt in 2012 and was hospitalized x1 week in [place].  Verbalizes that  increase in symptoms occurred after finding out about her first 's affair in 2012.  An affair    Previous Psych Meds: Reports having tried several past psychiatric medications, but verbalizes she does not recall specific names.    Past Medical History: Has other past medical history of diabetes and hypertension    Social History: Lives with  of 4 years in [place].  Says that she and her  have been together for a total of 10 years.  She has 3 grown children by a previous marriage not currently living in the home.  Reports close relationship with her daughter.     The following portions of the patient's history were reviewed and updated as appropriate: allergies, current medications, past family history, past medical history, past social history, past surgical history and problem list.      Past Medical History:  Past Medical History:   Diagnosis Date   • Abnormal Pap smear of cervix    • Acid reflux    • Anxiety    • Asthma, extrinsic    • Bronchitis    • Depression    • Diabetes (HCC)    • GERD (gastroesophageal reflux disease)    • Gestational diabetes    • Gestational hypertension    • HPV (human papilloma virus) infection    • Hypertension    • Pleurisy        Social History:  Social History     Socioeconomic History   • Marital status:    Tobacco Use   • Smoking status: Former     Packs/day: 1.00     Years: 20.00     Pack years: 20.00     Types: Cigarettes     Quit date: 6/21/2012     Years since quitting: 10.3   • Smokeless tobacco: Never   Vaping Use   • Vaping Use: Never used   Substance and Sexual Activity   • Alcohol use: No   • Drug use: No   • Sexual activity: Not Currently     Partners: Male     Birth control/protection: Surgical       Family History:  Family History   Problem Relation Age of Onset   • Heart attack Mother    • Emphysema Father        Past Surgical History:  Past Surgical History:   Procedure Laterality Date   • COLONOSCOPY N/A 6/28/2017    Procedure:  COLONOSCOPY;  Surgeon: Abdi Del Castillo MD;  Location: [facility] ENDOSCOPY;  Service:    • GALLBLADDER SURGERY     • HYSTERECTOMY     • RECTOVAGINAL FISTULA REPAIR     • TUBAL ABDOMINAL LIGATION     • VEIN LIGATION AND STRIPPING Right        Problem List:  There is no problem list on file for this patient.      Allergy:   No Known Allergies     Current Medications:   Current Outpatient Medications   Medication Sig Dispense Refill   • amLODIPine (NORVASC) 10 MG tablet      • ARIPiprazole (ABILIFY) 5 MG tablet Take 1 tablet by mouth Daily for 60 days. 30 tablet 1   • calcium carbonate (TUMS) 500 MG chewable tablet Chew 1 tablet Daily.     • Calcium Carbonate-Vitamin D (CALCIUM PLUS VITAMIN D PO) Take 1 tablet by mouth Daily.     • cetirizine (zyrTEC) 10 MG tablet TAKE 1 TABLET BY MOUTH ONE TIME A DAY  2   • clonazePAM (KlonoPIN) 0.5 MG tablet Take 0.5 mg by mouth 2 (Two) Times a Day.     • cyclobenzaprine (FLEXERIL) 10 MG tablet Take 10 mg by mouth 3 (Three) Times a Day As Needed for Muscle Spasms.     • diclofenac (VOLTAREN) 75 MG EC tablet Take 75 mg by mouth 2 (Two) Times a Day.     • DULoxetine (CYMBALTA) 30 MG capsule Take 1 capsule by mouth Daily.     • FLUoxetine (PROzac) 40 MG capsule Take 40 mg by mouth Daily.     • fluticasone (FLONASE) 50 MCG/ACT nasal spray INHALE 1 SPRAY IN EACH NOSTRIL ONE TIME A DAY-USES PRN  2   • hydrOXYzine (ATARAX) 25 MG tablet Take 1 tablet by mouth 3 (Three) Times a Day As Needed for Itching.     • Loratadine 10 MG capsule Take 10 capsules by mouth.     • meloxicam (MOBIC) 15 MG tablet Take 15 mg by mouth Daily.     • methocarbamol (ROBAXIN) 750 MG tablet Take 750 mg by mouth Every Night.     • montelukast (SINGULAIR) 10 MG tablet Take 10 mg by mouth Every Night.     • Multiple Vitamins-Minerals (b complex-C-E-zinc) tablet Take 1 tablet by mouth Daily.     • omeprazole (priLOSEC) 20 MG capsule Take 20 mg by mouth Every Morning.     • PROAIR  (90 BASE) MCG/ACT inhaler INHALE 2  "PUFFS BY MOUTH FOUR TIMES A DAY AS NEEDED  2   • SITagliptin-metFORMIN HCl ER (Janumet XR)  MG tablet Take 1 tablet by mouth Daily.     • traZODone (DESYREL) 150 MG tablet Take 150 mg by mouth every night at bedtime.       No current facility-administered medications for this visit.       Review of Symptoms:    Review of Systems   Constitutional: Positive for fatigue. Negative for activity change, appetite change, unexpected weight gain and unexpected weight loss.   Respiratory: Negative for shortness of breath.    Cardiovascular: Negative for chest pain.   Psychiatric/Behavioral: Positive for decreased concentration, sleep disturbance, depressed mood and stress. Negative for hallucinations and suicidal ideas. The patient is nervous/anxious.      Physical Exam:   Physical Exam  Vitals reviewed.   Constitutional:       General: She is not in acute distress.     Appearance: Normal appearance.   Neurological:      Mental Status: She is alert.      Gait: Gait normal.     Vitals:   Blood pressure 112/72, height 167.6 cm (66\"), weight 107 kg (235 lb), not currently breastfeeding.    Mental Status Exam:   Hygiene:   good  Cooperation:  Cooperative  Eye Contact:  Good  Psychomotor Behavior:  Appropriate  Affect:  Blunted  Mood: depressed  Hopelessness: Denies  Speech:  Monotone  Thought Process:  Goal directed and Linear  Thought Content:  Mood congruent  Suicidal:  None  Homicidal:  None  Hallucinations:  None  Delusion:  None  Memory:  Intact  Orientation:  Person, Place, Time and Situation  Reliability:  good  Insight:  Fair  Judgement:  Fair  Impulse Control:  Good    Lab Results:   No visits with results within 6 Month(s) from this visit.   Latest known visit with results is:   Admission on 06/28/2017, Discharged on 06/28/2017   Component Date Value Ref Range Status   • Glucose 06/28/2017 141 (H)  70 - 130 mg/dL Final    Serial Number: [id number]    : [id number]       EKG Results:  No orders to display "       Assessment & Plan   Problems Addressed this Visit    None  Visit Diagnoses     Moderate episode of recurrent major depressive disorder (HCC)    -  Primary    Relevant Medications    hydrOXYzine (ATARAX) 25 MG tablet    DULoxetine (CYMBALTA) 30 MG capsule    ARIPiprazole (ABILIFY) 5 MG tablet    Generalized anxiety disorder        Relevant Medications    hydrOXYzine (ATARAX) 25 MG tablet    DULoxetine (CYMBALTA) 30 MG capsule    ARIPiprazole (ABILIFY) 5 MG tablet    Panic attacks          Diagnoses       Codes Comments    Moderate episode of recurrent major depressive disorder (HCC)    -  Primary ICD-10-CM: F33.1  ICD-9-CM: 296.32     Generalized anxiety disorder     ICD-10-CM: F41.1  ICD-9-CM: 300.02     Panic attacks     ICD-10-CM: F41.0  ICD-9-CM: 300.01           Visit Diagnoses:    ICD-10-CM ICD-9-CM   1. Moderate episode of recurrent major depressive disorder (HCC)  F33.1 296.32   2. Generalized anxiety disorder  F41.1 300.02   3. Panic attacks  F41.0 300.01       -Reviewed previous available documentation and most recent available labs.   ALANNA reviewed and is appropriate.     -Discussed importance of counseling to decrease anxiety like symptoms.  She is agreeable to make appointment for counseling. Discussed coping mechanisms to decrease stress and anxiety: relaxation techniques, guided imagery, music therapy, staying active, support groups, diversional activities and avoid aggravating factors.  Discussed different coping mechanisms to better control depression.    -The benefits of a healthy diet and exercise were discussed with patient, especially the positive effects they have on mental health. Patient encouraged to consider lifestyle modification regarding diet and exercise patterns to maximize results of mental health treatment.     Encouraged patient to practice good sleep hygiene.  Discussed going to bed at the same time and getting up at the same time every day. Consider a quiet activity, such  as reading, part of your nighttime routine. Make your bedroom a dark, comfortable place where it is easy to fall asleep. Avoid or limit caffeine consumption. Limit screen use, especially two hours prior to bed (this includes watching TV, using smartphone, tablet or computer).     Discussed plan of care and medication management.  She is agreeable to make changes to current regimen.  Will taper her Cymbalta and continue other medications as previously prescribed that include trazodone, Prozac, clonazepam and hydroxyzine.  She is agreeable to add Abilify and follow-up to discuss any remaining symptoms and further medication adjustments.  -Start Abilify 5 mg daily for depression and mood stabilization  -Continue Prozac 40 mg daily for anxiety and depression  -Continue trazodone 150 mg daily for depression and sleep  -Continue hydroxyzine 25 mg as needed 3 times daily for anxiety.  -Continue clonazepam 0.5 mg twice daily as needed for anxiety as previously prescribed by PCP    GOALS:  Short Term Goals: Patient will be compliant with medication, and patient will have no significant medication related side effects.  Patient will be engaged in psychotherapy as indicated.  Patient will report subjective improvement of symptoms.  Long term goals: To stabilize mood and treat/improve subjective symptoms, the patient will stay out of the hospital, the patient will be at an optimal level of functioning, and the patient will take all medications as prescribed.  The patient/guardian verbalized understanding and agreement with goals that were mutually set.    TREATMENT PLAN: Continue supportive psychotherapy efforts and medications as indicated for patient's diagnosis.  Pharmacological and Non-Pharmacological treatment options discussed during today's visit. Patient/Guardian acknowledged and verbally consented with current treatment plan and was educated on the importance of compliance with treatment and follow-up appointments.       MEDICATION ISSUES:  Discussed medication options and treatment plan of prescribed medication as well as the risks, benefits, any black box warnings, and side effects including potential falls, possible impaired driving, and metabolic adversities among others. Patient is agreeable to call the office with any worsening of symptoms or onset of side effects, or if any concerns or questions arise.  The contact information for the office is made available to the patient. Patient is agreeable to call 911 or go to the nearest ER should they begin having any SI/HI, or if any urgent concerns arise. No medication side effects or related complaints today.     MEDS ORDERED DURING VISIT:  New Medications Ordered This Visit   Medications   • ARIPiprazole (ABILIFY) 5 MG tablet     Sig: Take 1 tablet by mouth Daily for 60 days.     Dispense:  30 tablet     Refill:  1       FOLLOW UP:  Return in about 4 weeks (around 11/29/2022) for Recheck.             This document has been electronically signed by SALOMÓN Choi  November 1, 2022 09:51 EDT    Please note that portions of this note were completed with a voice recognition program. Efforts were made to edit dictation, but occasionally words are mistranscribed.

## 2022-11-02 ENCOUNTER — OFFICE VISIT (OUTPATIENT)
Dept: PSYCHIATRY | Facility: CLINIC | Age: 57
End: 2022-11-02

## 2022-11-02 DIAGNOSIS — F41.9 ANXIETY: Primary | ICD-10-CM

## 2022-11-02 PROCEDURE — 90837 PSYTX W PT 60 MINUTES: CPT | Performed by: COUNSELOR

## 2022-11-02 NOTE — PROGRESS NOTES
Date:2022   Patient Name: Ana Samaniego  : 1965   MRN: [id number]   Time IN: 3:32    Time OUT: 4:32     Referring Provider: Terese Camacho PA    PROGRESS NOTE    History of Present Illness:   Ana Samaniego is a 57 y.o. female who is being seen today for follow up individual Psychotherapy session. Patient expressed some anxiety she has been experiencing on the daily basis. The patient expressed some triggers and techniques that she can use to assist her in feeling less anxious.     Chief Complaint:      ICD-10-CM ICD-9-CM   1. Anxiety  F41.9 300.00        Clinical Maneuvering/Intervention: CBT technique to assist the patient in building rapport, identifying treatment goals and lowering anxiety symptoms    Assisted patient in processing above session content; acknowledged and normalized patient’s thoughts, feelings, and concerns to build appropriate rapport and a positive therapeutic relationship with open and honest communication.  Rationalized patient thought process regarding Anxitety.  Discussed triggers associated with patient's Anxiety.  Also discussed coping skills for patient to implement such as Anxiety.    Allowed patient to freely discuss issues without interruption or judgment. Provided safe, confidential environment to facilitate the development of positive therapeutic relationship and encourage open, honest communication. Assisted patient in identifying risk factors which would indicate the need for higher level of care including thoughts to harm self or others and/or self-harming behavior and encouraged patient to contact this office, call 911, or present to the nearest emergency room should any of these events occur. Discussed crisis intervention services and means to access. Patient adamantly and convincingly denies current suicidal or homicidal ideation or perceptual disturbance.    Assessment Scores:   PHQ-9 Total Score:     ES-7 Score:    PTSD Total Score:  .PTSDTOTALSCORE    (Scales based on 0 - 10 with 10 being the worst)  Depression: 1 Anxiety: 2       Mental Status Exam:   Hygiene:   good  Cooperation:  Cooperative  Eye Contact:  Good  Psychomotor Behavior:  Restless  Affect:  Appropriate  Mood: sad  Speech:  Normal  Thought Process:  Goal directed  Thought Content:  Mood congruent  Suicidal:  None  Homicidal:  None  Hallucinations:  None  Delusion:  None  Memory:  Intact  Orientation:  Person  Reliability:  good  Insight:  Good and Fair  Judgement:  Fair  Impulse Control:  Fair  Physical/Medical Issues:  No      Patient's Support Network Includes:  children    Functional Status: Mild impairment     Progress toward goal: At goal    Prognosis: Good with Ongoing Treatment     Medications:     Current Outpatient Medications:   •  amLODIPine (NORVASC) 10 MG tablet, , Disp: , Rfl:   •  ARIPiprazole (ABILIFY) 5 MG tablet, Take 1 tablet by mouth Daily for 60 days., Disp: 30 tablet, Rfl: 1  •  calcium carbonate (TUMS) 500 MG chewable tablet, Chew 1 tablet Daily., Disp: , Rfl:   •  Calcium Carbonate-Vitamin D (CALCIUM PLUS VITAMIN D PO), Take 1 tablet by mouth Daily., Disp: , Rfl:   •  cetirizine (zyrTEC) 10 MG tablet, TAKE 1 TABLET BY MOUTH ONE TIME A DAY, Disp: , Rfl: 2  •  clonazePAM (KlonoPIN) 0.5 MG tablet, Take 0.5 mg by mouth 2 (Two) Times a Day., Disp: , Rfl:   •  cyclobenzaprine (FLEXERIL) 10 MG tablet, Take 10 mg by mouth 3 (Three) Times a Day As Needed for Muscle Spasms., Disp: , Rfl:   •  diclofenac (VOLTAREN) 75 MG EC tablet, Take 75 mg by mouth 2 (Two) Times a Day., Disp: , Rfl:   •  DULoxetine (CYMBALTA) 30 MG capsule, Take 1 capsule by mouth Daily., Disp: , Rfl:   •  FLUoxetine (PROzac) 40 MG capsule, Take 40 mg by mouth Daily., Disp: , Rfl:   •  fluticasone (FLONASE) 50 MCG/ACT nasal spray, INHALE 1 SPRAY IN EACH NOSTRIL ONE TIME A DAY-USES PRN, Disp: , Rfl: 2  •  hydrOXYzine (ATARAX) 25 MG tablet, Take 1 tablet by mouth 3 (Three) Times a Day As Needed for  Itching., Disp: , Rfl:   •  Loratadine 10 MG capsule, Take 10 capsules by mouth., Disp: , Rfl:   •  meloxicam (MOBIC) 15 MG tablet, Take 15 mg by mouth Daily., Disp: , Rfl:   •  methocarbamol (ROBAXIN) 750 MG tablet, Take 750 mg by mouth Every Night., Disp: , Rfl:   •  montelukast (SINGULAIR) 10 MG tablet, Take 10 mg by mouth Every Night., Disp: , Rfl:   •  Multiple Vitamins-Minerals (b complex-C-E-zinc) tablet, Take 1 tablet by mouth Daily., Disp: , Rfl:   •  omeprazole (priLOSEC) 20 MG capsule, Take 20 mg by mouth Every Morning., Disp: , Rfl:   •  PROAIR  (90 BASE) MCG/ACT inhaler, INHALE 2 PUFFS BY MOUTH FOUR TIMES A DAY AS NEEDED, Disp: , Rfl: 2  •  SITagliptin-metFORMIN HCl ER (Janumet XR)  MG tablet, Take 1 tablet by mouth Daily., Disp: , Rfl:   •  traZODone (DESYREL) 150 MG tablet, Take 150 mg by mouth every night at bedtime., Disp: , Rfl:     Visit Diagnosis/Orders Placed This Visit:    ICD-10-CM ICD-9-CM   1. Anxiety  F41.9 300.00        PLAN:  1. Safety: No acute safety concerns  2. Risk Assessment: Risk of self-harm acutely is low. Risk of self-harm chronically is also low, but could be further elevated in the event of treatment noncompliance and/or AODA.    Crisis Plan:  Symptoms and/or behaviors to indicate a crisis: Excessive worry or fear    What calming techniques or other strategies will patient use to de-escalate and stay safe: slow down, breathe, visualize calming self, think it though, listen to music, change focus, take a walk    Who is one person patient can contact to assist with de-escalation? Daughter or son    Treatment Plan/Goals: Patient will continue supportive psychotherapy efforts and medication regimen as prescribed. Therapist will provide Cognitive Behavioral Therapy to assist patient in improving functioning and gaining coping skills, maintaining stability, and avoiding decompensation and the need for higher level of care. Plan for treatment was discussed during today's  visit. Patient acknowledged and verbally consented to continue with current treatment plan and was educated on the importance of compliance with treatment and follow-up appointments.     Patient will contact this office, call 911 or present to the nearest emergency room should suicidal or homicidal ideations occur.     Follow Up:   No follow-ups on file.      TAL Negro   [facility]     This document has been electronically signed by TAL Negro   November 2, 2022 16:30 EDT

## 2022-11-16 ENCOUNTER — OFFICE VISIT (OUTPATIENT)
Dept: PSYCHIATRY | Facility: CLINIC | Age: 57
End: 2022-11-16

## 2022-11-16 DIAGNOSIS — F41.9 ANXIETY: Primary | ICD-10-CM

## 2022-11-16 PROCEDURE — 90837 PSYTX W PT 60 MINUTES: CPT | Performed by: COUNSELOR

## 2022-11-16 NOTE — PROGRESS NOTES
Date:2022   Patient Name: Ana Samaniego  : 1965   MRN: [id number]   Time IN: 10:00    Time OUT: 10:54     Referring Provider: Terese Camacho PA    PROGRESS NOTE    History of Present Illness:   Ana Samaniego is a 57 y.o. female who is being seen today for follow up individual Psychotherapy session. PT expressed some emotions of frustrations, restlessness, over thinking. PT expressed some anxiety emotions related to traumatic events and created a smart goal to do one thing that would help her feel happier this week.     Chief Complaint:      ICD-10-CM ICD-9-CM   1. Anxiety  F41.9 300.00        Clinical Maneuvering/Intervention: CBT techniques to assist with lowering anxiety symotoms    Assisted patient in processing above session content; acknowledged and normalized patient’s thoughts, feelings, and concerns to build appropriate rapport and a positive therapeutic relationship with open and honest communication.  Rationalized patient thought process regarding anxiety.  Discussed triggers associated with patient's anxiety.  Also discussed coping skills for patient to implement such as mindful breathing.    Allowed patient to freely discuss issues without interruption or judgment. Provided safe, confidential environment to facilitate the development of positive therapeutic relationship and encourage open, honest communication. Assisted patient in identifying risk factors which would indicate the need for higher level of care including thoughts to harm self or others and/or self-harming behavior and encouraged patient to contact this office, call 911, or present to the nearest emergency room should any of these events occur. Discussed crisis intervention services and means to access. Patient adamantly and convincingly denies current suicidal or homicidal ideation or perceptual disturbance.    Assessment Scores:   PHQ-9 Total Score:     ES-7 Score:    PTSD Total Score:  .PTSDTOTALSCORE    (Scales based on 0 - 10 with 10 being the worst)  Depression: 0 Anxiety: 3       Mental Status Exam:   Hygiene:   good  Cooperation:  Cooperative  Eye Contact:  Good  Psychomotor Behavior:  Restless  Affect:  Appropriate  Mood: anxious  Speech:  Normal  Thought Process:  Linear  Thought Content:  Mood congruent  Suicidal:  None  Homicidal:  None  Hallucinations:  None  Delusion:  None  Memory:  Intact  Orientation:  Person  Reliability:  good  Insight:  Good  Judgement:  Good  Impulse Control:  Good  Physical/Medical Issues:  No      Patient's Support Network Includes:  daughter    Functional Status: Moderate impairment     Progress toward goal: At goal    Prognosis: Good with Ongoing Treatment     Medications:     Current Outpatient Medications:   •  amLODIPine (NORVASC) 10 MG tablet, , Disp: , Rfl:   •  ARIPiprazole (ABILIFY) 5 MG tablet, Take 1 tablet by mouth Daily for 60 days., Disp: 30 tablet, Rfl: 1  •  calcium carbonate (TUMS) 500 MG chewable tablet, Chew 1 tablet Daily., Disp: , Rfl:   •  Calcium Carbonate-Vitamin D (CALCIUM PLUS VITAMIN D PO), Take 1 tablet by mouth Daily., Disp: , Rfl:   •  cetirizine (zyrTEC) 10 MG tablet, TAKE 1 TABLET BY MOUTH ONE TIME A DAY, Disp: , Rfl: 2  •  clonazePAM (KlonoPIN) 0.5 MG tablet, Take 0.5 mg by mouth 2 (Two) Times a Day., Disp: , Rfl:   •  cyclobenzaprine (FLEXERIL) 10 MG tablet, Take 10 mg by mouth 3 (Three) Times a Day As Needed for Muscle Spasms., Disp: , Rfl:   •  diclofenac (VOLTAREN) 75 MG EC tablet, Take 75 mg by mouth 2 (Two) Times a Day., Disp: , Rfl:   •  DULoxetine (CYMBALTA) 30 MG capsule, Take 1 capsule by mouth Daily., Disp: , Rfl:   •  FLUoxetine (PROzac) 40 MG capsule, Take 40 mg by mouth Daily., Disp: , Rfl:   •  fluticasone (FLONASE) 50 MCG/ACT nasal spray, INHALE 1 SPRAY IN EACH NOSTRIL ONE TIME A DAY-USES PRN, Disp: , Rfl: 2  •  hydrOXYzine (ATARAX) 25 MG tablet, Take 1 tablet by mouth 3 (Three) Times a Day As Needed for  Itching., Disp: , Rfl:   •  Loratadine 10 MG capsule, Take 10 capsules by mouth., Disp: , Rfl:   •  meloxicam (MOBIC) 15 MG tablet, Take 15 mg by mouth Daily., Disp: , Rfl:   •  methocarbamol (ROBAXIN) 750 MG tablet, Take 750 mg by mouth Every Night., Disp: , Rfl:   •  montelukast (SINGULAIR) 10 MG tablet, Take 10 mg by mouth Every Night., Disp: , Rfl:   •  Multiple Vitamins-Minerals (b complex-C-E-zinc) tablet, Take 1 tablet by mouth Daily., Disp: , Rfl:   •  omeprazole (priLOSEC) 20 MG capsule, Take 20 mg by mouth Every Morning., Disp: , Rfl:   •  PROAIR  (90 BASE) MCG/ACT inhaler, INHALE 2 PUFFS BY MOUTH FOUR TIMES A DAY AS NEEDED, Disp: , Rfl: 2  •  SITagliptin-metFORMIN HCl ER (Janumet XR)  MG tablet, Take 1 tablet by mouth Daily., Disp: , Rfl:   •  traZODone (DESYREL) 150 MG tablet, Take 150 mg by mouth every night at bedtime., Disp: , Rfl:     Visit Diagnosis/Orders Placed This Visit:    ICD-10-CM ICD-9-CM   1. Anxiety  F41.9 300.00        PLAN:  1. Safety: No acute safety concerns  2. Risk Assessment: Risk of self-harm acutely is low. Risk of self-harm chronically is also low, but could be further elevated in the event of treatment noncompliance and/or AODA.    Crisis Plan:  Symptoms and/or behaviors to indicate a crisis: Excessive worry or fear    What calming techniques or other strategies will patient use to de-escalate and stay safe: slow down, breathe, visualize calming self, think it though, listen to music, change focus, take a walk    Who is one person patient can contact to assist with de-escalation? Lis    Treatment Plan/Goals: Patient will continue supportive psychotherapy efforts and medication regimen as prescribed. Therapist will provide Cognitive Behavioral Therapy to assist patient in improving functioning and gaining coping skills, maintaining stability, and avoiding decompensation and the need for higher level of care. Plan for treatment was discussed during today's visit.  Patient acknowledged and verbally consented to continue with current treatment plan and was educated on the importance of compliance with treatment and follow-up appointments.     Patient will contact this office, call 911 or present to the nearest emergency room should suicidal or homicidal ideations occur.     Follow Up:   Return in about 2 weeks (around 11/30/2022).      TAL Negro   [facility]     This document has been electronically signed by TAL Negro   November 16, 2022 10:51 EST

## 2022-11-23 ENCOUNTER — OFFICE VISIT (OUTPATIENT)
Dept: PSYCHIATRY | Facility: CLINIC | Age: 57
End: 2022-11-23

## 2022-11-23 DIAGNOSIS — F41.9 ANXIETY: Primary | ICD-10-CM

## 2022-11-23 PROCEDURE — 90837 PSYTX W PT 60 MINUTES: CPT | Performed by: COUNSELOR

## 2022-11-23 NOTE — PROGRESS NOTES
Date:2022   Patient Name: Ana Samaniego  : 1965   MRN: [id number]   Time IN: 10:57    Time OUT: 11:53     Referring Provider: Terese Camacho PA    PROGRESS NOTE    History of Present Illness:   Ana Samaniego is a 57 y.o. female who is being seen today for follow up individual Psychotherapy session. PT expressed some worry, restlessness, and struggling with sleep.    Chief Complaint:      ICD-10-CM ICD-9-CM   1. Anxiety  F41.9 300.00        Clinical Maneuvering/Intervention: CBT technique to assist with lowering anxiety    Assisted patient in processing above session content; acknowledged and normalized patient’s thoughts, feelings, and concerns to build appropriate rapport and a positive therapeutic relationship with open and honest communication.  Rationalized patient thought process regarding anxiety.  Discussed triggers associated with patient's anxiety.  Also discussed coping skills for patient to implement such as Mindful breathing.    Allowed patient to freely discuss issues without interruption or judgment. Provided safe, confidential environment to facilitate the development of positive therapeutic relationship and encourage open, honest communication. Assisted patient in identifying risk factors which would indicate the need for higher level of care including thoughts to harm self or others and/or self-harming behavior and encouraged patient to contact this office, call 911, or present to the nearest emergency room should any of these events occur. Discussed crisis intervention services and means to access. Patient adamantly and convincingly denies current suicidal or homicidal ideation or perceptual disturbance.    Assessment Scores:   PHQ-9 Total Score:     ES-7 Score:    PTSD Total Score: .PTSDTOTALSCORE    (Scales based on 0 - 10 with 10 being the worst)  Depression: 6 Anxiety: 7       Mental Status Exam:   Hygiene:   good  Cooperation:  Cooperative  Eye Contact:   Good  Psychomotor Behavior:  Slow  Affect:  Appropriate  Mood: anxious  Speech:  Normal  Thought Process:  Linear  Thought Content:  Mood congruent  Suicidal:  None  Homicidal:  None  Hallucinations:  None  Delusion:  None  Memory:  Intact  Orientation:  Person  Reliability:  good  Insight:  Good  Judgement:  Good  Impulse Control:  Good  Physical/Medical Issues:  No      Patient's Support Network Includes:  daughter    Functional Status: Moderate impairment     Progress toward goal: At goal    Prognosis: Good with Ongoing Treatment     Medications:     Current Outpatient Medications:   •  amLODIPine (NORVASC) 10 MG tablet, , Disp: , Rfl:   •  ARIPiprazole (ABILIFY) 5 MG tablet, Take 1 tablet by mouth Daily for 60 days., Disp: 30 tablet, Rfl: 1  •  calcium carbonate (TUMS) 500 MG chewable tablet, Chew 1 tablet Daily., Disp: , Rfl:   •  Calcium Carbonate-Vitamin D (CALCIUM PLUS VITAMIN D PO), Take 1 tablet by mouth Daily., Disp: , Rfl:   •  cetirizine (zyrTEC) 10 MG tablet, TAKE 1 TABLET BY MOUTH ONE TIME A DAY, Disp: , Rfl: 2  •  clonazePAM (KlonoPIN) 0.5 MG tablet, Take 0.5 mg by mouth 2 (Two) Times a Day., Disp: , Rfl:   •  cyclobenzaprine (FLEXERIL) 10 MG tablet, Take 10 mg by mouth 3 (Three) Times a Day As Needed for Muscle Spasms., Disp: , Rfl:   •  diclofenac (VOLTAREN) 75 MG EC tablet, Take 75 mg by mouth 2 (Two) Times a Day., Disp: , Rfl:   •  DULoxetine (CYMBALTA) 30 MG capsule, Take 1 capsule by mouth Daily., Disp: , Rfl:   •  FLUoxetine (PROzac) 40 MG capsule, Take 40 mg by mouth Daily., Disp: , Rfl:   •  fluticasone (FLONASE) 50 MCG/ACT nasal spray, INHALE 1 SPRAY IN EACH NOSTRIL ONE TIME A DAY-USES PRN, Disp: , Rfl: 2  •  hydrOXYzine (ATARAX) 25 MG tablet, Take 1 tablet by mouth 3 (Three) Times a Day As Needed for Itching., Disp: , Rfl:   •  Loratadine 10 MG capsule, Take 10 capsules by mouth., Disp: , Rfl:   •  meloxicam (MOBIC) 15 MG tablet, Take 15 mg by mouth Daily., Disp: , Rfl:   •  methocarbamol  (ROBAXIN) 750 MG tablet, Take 750 mg by mouth Every Night., Disp: , Rfl:   •  montelukast (SINGULAIR) 10 MG tablet, Take 10 mg by mouth Every Night., Disp: , Rfl:   •  Multiple Vitamins-Minerals (b complex-C-E-zinc) tablet, Take 1 tablet by mouth Daily., Disp: , Rfl:   •  omeprazole (priLOSEC) 20 MG capsule, Take 20 mg by mouth Every Morning., Disp: , Rfl:   •  PROAIR  (90 BASE) MCG/ACT inhaler, INHALE 2 PUFFS BY MOUTH FOUR TIMES A DAY AS NEEDED, Disp: , Rfl: 2  •  SITagliptin-metFORMIN HCl ER (Janumet XR)  MG tablet, Take 1 tablet by mouth Daily., Disp: , Rfl:   •  traZODone (DESYREL) 150 MG tablet, Take 150 mg by mouth every night at bedtime., Disp: , Rfl:     Visit Diagnosis/Orders Placed This Visit:    ICD-10-CM ICD-9-CM   1. Anxiety  F41.9 300.00        PLAN:  1. Safety: No acute safety concerns  2. Risk Assessment: Risk of self-harm acutely is low. Risk of self-harm chronically is also low, but could be further elevated in the event of treatment noncompliance and/or AODA.    Crisis Plan:  Symptoms and/or behaviors to indicate a crisis: Excessive worry or fear    What calming techniques or other strategies will patient use to de-escalate and stay safe: slow down, breathe, visualize calming self, think it though, listen to music, change focus, take a walk    Who is one person patient can contact to assist with de-escalation? Daughter    Treatment Plan/Goals: Patient will continue supportive psychotherapy efforts and medication regimen as prescribed. Therapist will provide Cognitive Behavioral Therapy to assist patient in improving functioning and gaining coping skills, maintaining stability, and avoiding decompensation and the need for higher level of care. Plan for treatment was discussed during today's visit. Patient acknowledged and verbally consented to continue with current treatment plan and was educated on the importance of compliance with treatment and follow-up appointments.     Patient  will contact this office, call 911 or present to the nearest emergency room should suicidal or homicidal ideations occur.     Follow Up:   Return in about 2 weeks (around 12/7/2022).      TAL Negro   [facility] [place]     This document has been electronically signed by TAL Negro   November 23, 2022 11:53 EST

## 2022-12-13 ENCOUNTER — OFFICE VISIT (OUTPATIENT)
Dept: PSYCHIATRY | Facility: CLINIC | Age: 57
End: 2022-12-13

## 2022-12-13 VITALS
WEIGHT: 235 LBS | DIASTOLIC BLOOD PRESSURE: 84 MMHG | HEART RATE: 93 BPM | SYSTOLIC BLOOD PRESSURE: 126 MMHG | HEIGHT: 66 IN | BODY MASS INDEX: 37.77 KG/M2

## 2022-12-13 DIAGNOSIS — F41.1 GENERALIZED ANXIETY DISORDER: ICD-10-CM

## 2022-12-13 DIAGNOSIS — F33.1 MODERATE EPISODE OF RECURRENT MAJOR DEPRESSIVE DISORDER: Primary | ICD-10-CM

## 2022-12-13 PROCEDURE — 99214 OFFICE O/P EST MOD 30 MIN: CPT | Performed by: NURSE PRACTITIONER

## 2022-12-13 RX ORDER — ARIPIPRAZOLE 10 MG/1
10 TABLET ORAL DAILY
Qty: 30 TABLET | Refills: 1 | Status: SHIPPED | OUTPATIENT
Start: 2022-12-13 | End: 2023-02-15

## 2022-12-13 RX ORDER — TIRZEPATIDE 2.5 MG/.5ML
INJECTION, SOLUTION SUBCUTANEOUS
COMMUNITY
Start: 2022-12-02

## 2022-12-13 NOTE — PROGRESS NOTES
Subjective   Ana Samaniego is a 57 y.o. female who presents today for follow up    Chief Complaint: Depression, anxiety and panic attacks    History of Present Illness:   History of Present Illness  Ana Samaniego presents today for medication management follow-up.  Reports that she has been doing well overall since last visit, tapering and discontinuing Cymbalta as well as adding Abilify to her medication regimen.  Says that she has noticed slight improvement in depression and mood fluctuations.  She continues to experience daytime fatigue and still has days with very little to no motivation.  Verbalizes that her  and daughter have reported that they have noticed that she is more active and motivated as well as outgoing.  Reports that sleep continues to be broken, often waking up intermittently during the night.  She has also noticed some improvement in panic episodes.  Panic attacks occur intermittently and sometimes without apparent reason. Symptoms with panic episode include chest pressure/tightness, SOA and fidgetiness.  Reports appetite has improved since stopping Trulicity. Adamantly denies any current SI/HI or AV hallucinations. PHQ-9 total score: 18, ES-7 total score: 13    Previous Psych Meds: Reports having tried several past psychiatric medications, but verbalizes she does not recall specific names.     The following portions of the patient's history were reviewed and updated as appropriate: allergies, current medications, past family history, past medical history, past social history, past surgical history and problem list.      Past Medical History:  Past Medical History:   Diagnosis Date   • Abnormal Pap smear of cervix    • Acid reflux    • Anxiety    • Asthma, extrinsic    • Bronchitis    • Depression    • Diabetes (HCC)    • GERD (gastroesophageal reflux disease)    • Gestational diabetes    • Gestational hypertension    • HPV (human papilloma virus) infection    • Hypertension     • Pleurisy        Social History:  Social History     Socioeconomic History   • Marital status:    Tobacco Use   • Smoking status: Former     Packs/day: 1.00     Years: 20.00     Pack years: 20.00     Types: Cigarettes     Quit date: 6/21/2012     Years since quitting: 10.5   • Smokeless tobacco: Never   Vaping Use   • Vaping Use: Never used   Substance and Sexual Activity   • Alcohol use: No   • Drug use: No   • Sexual activity: Not Currently     Partners: Male     Birth control/protection: Surgical       Family History:  Family History   Problem Relation Age of Onset   • Heart attack Mother    • Emphysema Father        Past Surgical History:  Past Surgical History:   Procedure Laterality Date   • COLONOSCOPY N/A 6/28/2017    Procedure: COLONOSCOPY;  Surgeon: Abdi Del Castillo MD;  Location: [facility] ENDOSCOPY;  Service:    • GALLBLADDER SURGERY     • HYSTERECTOMY     • RECTOVAGINAL FISTULA REPAIR     • TUBAL ABDOMINAL LIGATION     • VEIN LIGATION AND STRIPPING Right        Problem List:  There is no problem list on file for this patient.      Allergy:   No Known Allergies     Current Medications:   Current Outpatient Medications   Medication Sig Dispense Refill   • amLODIPine (NORVASC) 10 MG tablet      • ARIPiprazole (ABILIFY) 10 MG tablet Take 1 tablet by mouth Daily. 30 tablet 1   • calcium carbonate (TUMS) 500 MG chewable tablet Chew 1 tablet Daily.     • Calcium Carbonate-Vitamin D (CALCIUM PLUS VITAMIN D PO) Take 1 tablet by mouth Daily.     • cetirizine (zyrTEC) 10 MG tablet TAKE 1 TABLET BY MOUTH ONE TIME A DAY  2   • clonazePAM (KlonoPIN) 0.5 MG tablet Take 0.5 mg by mouth 2 (Two) Times a Day.     • cyclobenzaprine (FLEXERIL) 10 MG tablet Take 10 mg by mouth 3 (Three) Times a Day As Needed for Muscle Spasms.     • diclofenac (VOLTAREN) 75 MG EC tablet Take 75 mg by mouth 2 (Two) Times a Day.     • FLUoxetine (PROzac) 40 MG capsule Take 40 mg by mouth Daily.     • fluticasone (FLONASE) 50 MCG/ACT  "nasal spray INHALE 1 SPRAY IN EACH NOSTRIL ONE TIME A DAY-USES PRN  2   • hydrOXYzine (ATARAX) 25 MG tablet Take 1 tablet by mouth 3 (Three) Times a Day As Needed for Itching.     • Loratadine 10 MG capsule Take 10 capsules by mouth.     • meloxicam (MOBIC) 15 MG tablet Take 15 mg by mouth Daily.     • methocarbamol (ROBAXIN) 750 MG tablet Take 750 mg by mouth Every Night.     • montelukast (SINGULAIR) 10 MG tablet Take 10 mg by mouth Every Night.     • Mounjaro 2.5 MG/0.5ML solution pen-injector INJECT 0.5 ML SUBCUTANEOUSLY ONCE A WEEK     • Multiple Vitamins-Minerals (b complex-C-E-zinc) tablet Take 1 tablet by mouth Daily.     • omeprazole (priLOSEC) 20 MG capsule Take 20 mg by mouth Every Morning.     • PROAIR  (90 BASE) MCG/ACT inhaler INHALE 2 PUFFS BY MOUTH FOUR TIMES A DAY AS NEEDED  2   • SITagliptin-metFORMIN HCl ER (Janumet XR)  MG tablet Take 1 tablet by mouth Daily.     • traZODone (DESYREL) 150 MG tablet Take 150 mg by mouth every night at bedtime.       No current facility-administered medications for this visit.       Review of Symptoms:    Review of Systems   Constitutional: Positive for fatigue. Negative for activity change, appetite change, unexpected weight gain and unexpected weight loss.   Respiratory: Negative for shortness of breath.    Cardiovascular: Negative for chest pain.   Psychiatric/Behavioral: Positive for decreased concentration, sleep disturbance, depressed mood and stress. Negative for hallucinations and suicidal ideas. The patient is nervous/anxious.      Physical Exam:   Physical Exam  Vitals reviewed.   Constitutional:       General: She is not in acute distress.     Appearance: Normal appearance.   Neurological:      Mental Status: She is alert.      Gait: Gait normal.     Vitals:   Blood pressure 126/84, pulse 93, height 167.6 cm (66\"), weight 107 kg (235 lb), not currently breastfeeding.    Mental Status Exam:   Hygiene:   good  Cooperation:  Cooperative  Eye " Contact:  Good  Psychomotor Behavior:  Appropriate  Affect:  Blunted  Mood: depressed  Hopelessness: Denies  Speech:  Monotone  Thought Process:  Goal directed and Linear  Thought Content:  Mood congruent  Suicidal:  None  Homicidal:  None  Hallucinations:  None  Delusion:  None  Memory:  Intact  Orientation:  Person, Place, Time and Situation  Reliability:  good  Insight:  Fair  Judgement:  Fair  Impulse Control:  Good    Lab Results:   No visits with results within 6 Month(s) from this visit.   Latest known visit with results is:   Admission on 06/28/2017, Discharged on 06/28/2017   Component Date Value Ref Range Status   • Glucose 06/28/2017 141 (H)  70 - 130 mg/dL Final    Serial Number: [id number]    : [id number]       EKG Results:  No orders to display       Assessment & Plan   Problems Addressed this Visit    None  Visit Diagnoses     Moderate episode of recurrent major depressive disorder (HCC)    -  Primary    Relevant Medications    ARIPiprazole (ABILIFY) 10 MG tablet    Generalized anxiety disorder        Relevant Medications    ARIPiprazole (ABILIFY) 10 MG tablet      Diagnoses       Codes Comments    Moderate episode of recurrent major depressive disorder (HCC)    -  Primary ICD-10-CM: F33.1  ICD-9-CM: 296.32     Generalized anxiety disorder     ICD-10-CM: F41.1  ICD-9-CM: 300.02           Visit Diagnoses:    ICD-10-CM ICD-9-CM   1. Moderate episode of recurrent major depressive disorder (HCC)  F33.1 296.32   2. Generalized anxiety disorder  F41.1 300.02       -Reviewed previous available documentation and most recent available labs.   ALANNA reviewed and is appropriate.     -Discussed importance of counseling to decrease anxiety like symptoms.  She is agreeable to make appointment for counseling. Discussed coping mechanisms to decrease stress and anxiety: relaxation techniques, guided imagery, music therapy, staying active, support groups, diversional activities and avoid aggravating  factors.  Discussed different coping mechanisms to better control depression.    -The benefits of a healthy diet and exercise were discussed with patient, especially the positive effects they have on mental health. Patient encouraged to consider lifestyle modification regarding diet and exercise patterns to maximize results of mental health treatment.     Encouraged patient to practice good sleep hygiene.  Discussed going to bed at the same time and getting up at the same time every day. Consider a quiet activity, such as reading, part of your nighttime routine. Make your bedroom a dark, comfortable place where it is easy to fall asleep. Avoid or limit caffeine consumption. Limit screen use, especially two hours prior to bed (this includes watching TV, using smartphone, tablet or computer).     Discussed medication regimen and plan of care with Ana. She reports that she has noticed improvement in overall mood and depression since starting Abilify.  Verbalizes that she feels she could benefit from increasing medication. Agreeable to increase Abilify and continue other medications including Prozac, trazodone, hydroxyzine and clonazepam as previously prescribed.  Has done well Cymbalta taper as she is now discontinued medication.    -Increase Abilify from 5 mg to 10 mg daily for depression and mood stabilization  -Continue Prozac 40 mg daily for anxiety and depression  -Continue trazodone 150 mg daily for depression and sleep  -Continue hydroxyzine 25 mg as needed 3 times daily for anxiety.  -Continue clonazepam 0.5 mg twice daily as needed for anxiety as previously prescribed by PCP    GOALS:  Short Term Goals: Patient will be compliant with medication, and patient will have no significant medication related side effects.  Patient will be engaged in psychotherapy as indicated.  Patient will report subjective improvement of symptoms.  Long term goals: To stabilize mood and treat/improve subjective symptoms, the  patient will stay out of the hospital, the patient will be at an optimal level of functioning, and the patient will take all medications as prescribed.  The patient/guardian verbalized understanding and agreement with goals that were mutually set.    TREATMENT PLAN: Continue supportive psychotherapy efforts and medications as indicated for patient's diagnosis.  Pharmacological and Non-Pharmacological treatment options discussed during today's visit. Patient/Guardian acknowledged and verbally consented with current treatment plan and was educated on the importance of compliance with treatment and follow-up appointments.      MEDICATION ISSUES:  Discussed medication options and treatment plan of prescribed medication as well as the risks, benefits, any black box warnings, and side effects including potential falls, possible impaired driving, and metabolic adversities among others. Patient is agreeable to call the office with any worsening of symptoms or onset of side effects, or if any concerns or questions arise.  The contact information for the office is made available to the patient. Patient is agreeable to call 911 or go to the nearest ER should they begin having any SI/HI, or if any urgent concerns arise. No medication side effects or related complaints today.     MEDS ORDERED DURING VISIT:  New Medications Ordered This Visit   Medications   • ARIPiprazole (ABILIFY) 10 MG tablet     Sig: Take 1 tablet by mouth Daily.     Dispense:  30 tablet     Refill:  1       FOLLOW UP:  Return in about 4 weeks (around 1/10/2023) for Recheck.             This document has been electronically signed by SALOMÓN Choi  December 27, 2022 22:18 EST    Please note that portions of this note were completed with a voice recognition program. Efforts were made to edit dictation, but occasionally words are mistranscribed.

## 2023-01-13 ENCOUNTER — LAB (OUTPATIENT)
Dept: LAB | Facility: HOSPITAL | Age: 58
End: 2023-01-13
Payer: COMMERCIAL

## 2023-01-13 ENCOUNTER — OFFICE VISIT (OUTPATIENT)
Dept: PSYCHIATRY | Facility: CLINIC | Age: 58
End: 2023-01-13
Payer: COMMERCIAL

## 2023-01-13 VITALS
DIASTOLIC BLOOD PRESSURE: 88 MMHG | WEIGHT: 243 LBS | BODY MASS INDEX: 39.05 KG/M2 | SYSTOLIC BLOOD PRESSURE: 122 MMHG | HEIGHT: 66 IN

## 2023-01-13 DIAGNOSIS — F41.1 GENERALIZED ANXIETY DISORDER: ICD-10-CM

## 2023-01-13 DIAGNOSIS — F33.1 MODERATE EPISODE OF RECURRENT MAJOR DEPRESSIVE DISORDER: ICD-10-CM

## 2023-01-13 DIAGNOSIS — F41.0 PANIC ATTACKS: ICD-10-CM

## 2023-01-13 DIAGNOSIS — Z79.899 MEDICATION MANAGEMENT: Primary | ICD-10-CM

## 2023-01-13 DIAGNOSIS — Z79.899 MEDICATION MANAGEMENT: ICD-10-CM

## 2023-01-13 LAB
AMPHET+METHAMPHET UR QL: NEGATIVE
AMPHETAMINES UR QL: NEGATIVE
BARBITURATES UR QL SCN: NEGATIVE
BENZODIAZ UR QL SCN: NEGATIVE
BUPRENORPHINE SERPL-MCNC: NEGATIVE NG/ML
CANNABINOIDS SERPL QL: NEGATIVE
COCAINE UR QL: NEGATIVE
METHADONE UR QL SCN: NEGATIVE
OPIATES UR QL: NEGATIVE
OXYCODONE UR QL SCN: NEGATIVE
PCP UR QL SCN: NEGATIVE
PROPOXYPH UR QL: NEGATIVE
TRICYCLICS UR QL SCN: NEGATIVE

## 2023-01-13 PROCEDURE — 99214 OFFICE O/P EST MOD 30 MIN: CPT | Performed by: NURSE PRACTITIONER

## 2023-01-13 PROCEDURE — 80306 DRUG TEST PRSMV INSTRMNT: CPT

## 2023-01-13 RX ORDER — CLONAZEPAM 0.5 MG/1
0.5 TABLET ORAL 2 TIMES DAILY
Qty: 60 TABLET | Refills: 0 | Status: SHIPPED | OUTPATIENT
Start: 2023-01-13 | End: 2023-03-10 | Stop reason: SDUPTHER

## 2023-02-15 DIAGNOSIS — F33.1 MODERATE EPISODE OF RECURRENT MAJOR DEPRESSIVE DISORDER: ICD-10-CM

## 2023-02-15 RX ORDER — ARIPIPRAZOLE 10 MG/1
TABLET ORAL
Qty: 30 TABLET | Refills: 0 | Status: SHIPPED | OUTPATIENT
Start: 2023-02-15 | End: 2023-03-10 | Stop reason: SDUPTHER

## 2023-03-10 ENCOUNTER — OFFICE VISIT (OUTPATIENT)
Dept: PSYCHIATRY | Facility: CLINIC | Age: 58
End: 2023-03-10
Payer: COMMERCIAL

## 2023-03-10 VITALS
WEIGHT: 245 LBS | HEART RATE: 76 BPM | HEIGHT: 66 IN | SYSTOLIC BLOOD PRESSURE: 122 MMHG | BODY MASS INDEX: 39.37 KG/M2 | DIASTOLIC BLOOD PRESSURE: 74 MMHG

## 2023-03-10 DIAGNOSIS — F41.0 PANIC ATTACKS: ICD-10-CM

## 2023-03-10 DIAGNOSIS — F41.1 GENERALIZED ANXIETY DISORDER: ICD-10-CM

## 2023-03-10 DIAGNOSIS — F33.1 MODERATE EPISODE OF RECURRENT MAJOR DEPRESSIVE DISORDER: Primary | ICD-10-CM

## 2023-03-10 PROCEDURE — 99214 OFFICE O/P EST MOD 30 MIN: CPT | Performed by: NURSE PRACTITIONER

## 2023-03-10 RX ORDER — ARIPIPRAZOLE 10 MG/1
10 TABLET ORAL DAILY
Qty: 30 TABLET | Refills: 2 | Status: SHIPPED | OUTPATIENT
Start: 2023-03-10

## 2023-03-10 RX ORDER — CLONAZEPAM 0.5 MG/1
0.5 TABLET ORAL DAILY PRN
Qty: 30 TABLET | Refills: 0 | Status: SHIPPED | OUTPATIENT
Start: 2023-03-10

## 2023-03-10 RX ORDER — EMPAGLIFLOZIN 25 MG/1
1 TABLET, FILM COATED ORAL DAILY
COMMUNITY
Start: 2023-01-24

## 2023-03-10 RX ORDER — FLUOXETINE HYDROCHLORIDE 40 MG/1
40 CAPSULE ORAL DAILY
Qty: 30 CAPSULE | Refills: 2 | Status: SHIPPED | OUTPATIENT
Start: 2023-03-10

## 2023-03-10 NOTE — PROGRESS NOTES
"Subjective   Ana Samaniego is a 57 y.o. female who presents today for follow up    Chief Complaint: Depression, anxiety and panic attacks    History of Present Illness:   History of Present Illness  Ana Samaniego presents today for medication management follow-up.  Reports that she has been doing well since last visit.  Says that she has not \"not felt depressed at all.\"  Verbalizes that overall fatigue and motivation have improved.  Says that she also feels that overall anxiety has been well controlled with current medication regimen.  Reports feeling less anxious and less nervous. Says that she is continue to work on tapering clonazepam from daily dosing to as needed.  Reports that she has been doing well with taper and has had no panic attacks since last visit.  Says that she is also sleeping better, obtaining about 9 to 10 hours of sleep each night, waking up 1 time to use the restroom and is able to fall back to sleep easily.  Reports that appetite is good.  Adamantly denies any SI/HI or AVH.  PHQ-9 total score: 4, ES-7 total score: 3.    Previous Psych Meds: Reports having tried several past psychiatric medications, but verbalizes she does not recall specific names.     The following portions of the patient's history were reviewed and updated as appropriate: allergies, current medications, past family history, past medical history, past social history, past surgical history and problem list.      Past Medical History:  Past Medical History:   Diagnosis Date   • Abnormal Pap smear of cervix    • Acid reflux    • Anxiety    • Asthma, extrinsic    • Bronchitis    • Depression    • Diabetes (HCC)    • GERD (gastroesophageal reflux disease)    • Gestational diabetes    • Gestational hypertension    • HPV (human papilloma virus) infection    • Hypertension    • Pleurisy        Social History:  Social History     Socioeconomic History   • Marital status:    Tobacco Use   • Smoking status: Former "     Packs/day: 1.00     Years: 20.00     Pack years: 20.00     Types: Cigarettes     Quit date: 6/21/2012     Years since quitting: 10.7   • Smokeless tobacco: Never   Vaping Use   • Vaping Use: Never used   Substance and Sexual Activity   • Alcohol use: No   • Drug use: No   • Sexual activity: Defer     Partners: Male     Birth control/protection: Surgical       Family History:  Family History   Problem Relation Age of Onset   • Heart attack Mother    • Emphysema Father        Past Surgical History:  Past Surgical History:   Procedure Laterality Date   • COLONOSCOPY N/A 6/28/2017    Procedure: COLONOSCOPY;  Surgeon: Abdi Del Castillo MD;  Location: [facility] ENDOSCOPY;  Service:    • GALLBLADDER SURGERY     • HYSTERECTOMY     • RECTOVAGINAL FISTULA REPAIR     • TUBAL ABDOMINAL LIGATION     • VEIN LIGATION AND STRIPPING Right        Problem List:  There is no problem list on file for this patient.      Allergy:   No Known Allergies     Current Medications:   Current Outpatient Medications   Medication Sig Dispense Refill   • amLODIPine (NORVASC) 10 MG tablet      • ARIPiprazole (ABILIFY) 10 MG tablet Take 1 tablet by mouth Daily. 30 tablet 2   • calcium carbonate (TUMS) 500 MG chewable tablet Chew 1 tablet Daily.     • Calcium Carbonate-Vitamin D (CALCIUM PLUS VITAMIN D PO) Take 1 tablet by mouth Daily.     • cetirizine (zyrTEC) 10 MG tablet TAKE 1 TABLET BY MOUTH ONE TIME A DAY  2   • clonazePAM (KlonoPIN) 0.5 MG tablet Take 1 tablet by mouth Daily As Needed for Anxiety. 30 tablet 0   • diclofenac (VOLTAREN) 75 MG EC tablet Take 1 tablet by mouth 2 (Two) Times a Day.     • FLUoxetine (PROzac) 40 MG capsule Take 1 capsule by mouth Daily. 30 capsule 2   • fluticasone (FLONASE) 50 MCG/ACT nasal spray INHALE 1 SPRAY IN EACH NOSTRIL ONE TIME A DAY-USES PRN  2   • hydrOXYzine (ATARAX) 25 MG tablet Take 1 tablet by mouth 3 (Three) Times a Day As Needed for Itching.     • Jardiance 25 MG tablet tablet Take 1 tablet by mouth  "Daily.     • Loratadine 10 MG capsule Take 10 capsules by mouth.     • meloxicam (MOBIC) 15 MG tablet Take 1 tablet by mouth Daily.     • methocarbamol (ROBAXIN) 750 MG tablet Take 1 tablet by mouth Every Night.     • montelukast (SINGULAIR) 10 MG tablet Take 1 tablet by mouth Every Night.     • Mounjaro 2.5 MG/0.5ML solution pen-injector INJECT 0.5 ML SUBCUTANEOUSLY ONCE A WEEK     • omeprazole (priLOSEC) 20 MG capsule Take 1 capsule by mouth Every Morning.     • PROAIR  (90 BASE) MCG/ACT inhaler INHALE 2 PUFFS BY MOUTH FOUR TIMES A DAY AS NEEDED  2   • SITagliptin-metFORMIN HCl ER (Janumet XR)  MG tablet Take 1 tablet by mouth Daily.     • traZODone (DESYREL) 150 MG tablet Take 1 tablet by mouth every night at bedtime.     • Multiple Vitamins-Minerals (b complex-C-E-zinc) tablet Take 1 tablet by mouth Daily.       No current facility-administered medications for this visit.       Review of Symptoms:    Review of Systems   Constitutional: Negative for activity change, appetite change, unexpected weight gain and unexpected weight loss.   Respiratory: Negative for shortness of breath.    Cardiovascular: Negative for chest pain.   Psychiatric/Behavioral: Positive for decreased concentration, sleep disturbance, depressed mood and stress. Negative for hallucinations and suicidal ideas. The patient is nervous/anxious.      Physical Exam:   Physical Exam  Vitals reviewed.   Constitutional:       General: She is not in acute distress.     Appearance: Normal appearance.   Neurological:      Mental Status: She is alert.      Gait: Gait normal.     Vitals:   Blood pressure 122/74, pulse 76, height 167.6 cm (66\"), weight 111 kg (245 lb), not currently breastfeeding.    Mental Status Exam:   Hygiene:   good  Cooperation:  Cooperative  Eye Contact:  Good  Psychomotor Behavior:  Appropriate  Affect:  Appropriate  Mood: normal  Hopelessness: Denies  Speech:  Normal  Thought Process:  Goal directed and Linear  Thought " Content:  Mood congruent  Suicidal:  None  Homicidal:  None  Hallucinations:  None  Delusion:  None  Memory:  Intact  Orientation:  Person, Place, Time and Situation  Reliability:  good  Insight:  Good  Judgement:  Good  Impulse Control:  Good    Lab Results:   Lab on 01/13/2023   Component Date Value Ref Range Status   • THC, Screen, Urine 01/13/2023 Negative  Negative Final   • Phencyclidine (PCP), Urine 01/13/2023 Negative  Negative Final   • Cocaine Screen, Urine 01/13/2023 Negative  Negative Final   • Methamphetamine, Ur 01/13/2023 Negative  Negative Final   • Opiate Screen 01/13/2023 Negative  Negative Final   • Amphetamine Screen, Urine 01/13/2023 Negative  Negative Final   • Benzodiazepine Screen, Urine 01/13/2023 Negative  Negative Final   • Tricyclic Antidepressants Screen 01/13/2023 Negative  Negative Final   • Methadone Screen, Urine 01/13/2023 Negative  Negative Final   • Barbiturates Screen, Urine 01/13/2023 Negative  Negative Final   • Oxycodone Screen, Urine 01/13/2023 Negative  Negative Final   • Propoxyphene Screen 01/13/2023 Negative  Negative Final   • Buprenorphine, Screen, Urine 01/13/2023 Negative  Negative Final       EKG Results:  No orders to display       Assessment & Plan   Problems Addressed this Visit    None  Visit Diagnoses     Moderate episode of recurrent major depressive disorder (HCC)    -  Primary    Relevant Medications    ARIPiprazole (ABILIFY) 10 MG tablet    FLUoxetine (PROzac) 40 MG capsule    Generalized anxiety disorder        Relevant Medications    ARIPiprazole (ABILIFY) 10 MG tablet    FLUoxetine (PROzac) 40 MG capsule    clonazePAM (KlonoPIN) 0.5 MG tablet    Panic attacks          Diagnoses       Codes Comments    Moderate episode of recurrent major depressive disorder (HCC)    -  Primary ICD-10-CM: F33.1  ICD-9-CM: 296.32     Generalized anxiety disorder     ICD-10-CM: F41.1  ICD-9-CM: 300.02     Panic attacks     ICD-10-CM: F41.0  ICD-9-CM: 300.01           Visit  Diagnoses:    ICD-10-CM ICD-9-CM   1. Moderate episode of recurrent major depressive disorder (HCC)  F33.1 296.32   2. Generalized anxiety disorder  F41.1 300.02   3. Panic attacks  F41.0 300.01       -Reviewed previous available documentation and most recent available labs.   ALANNA reviewed and is appropriate.     -Discussed importance of counseling to decrease anxiety like symptoms.  She is agreeable to make appointment for counseling. Discussed coping mechanisms to decrease stress and anxiety: relaxation techniques, guided imagery, music therapy, staying active, support groups, diversional activities and avoid aggravating factors.  Discussed different coping mechanisms to better control depression.    -The benefits of a healthy diet and exercise were discussed with patient, especially the positive effects they have on mental health. Patient encouraged to consider lifestyle modification regarding diet and exercise patterns to maximize results of mental health treatment.     Encouraged patient to practice good sleep hygiene.  Discussed going to bed at the same time and getting up at the same time every day. Consider a quiet activity, such as reading, part of your nighttime routine. Make your bedroom a dark, comfortable place where it is easy to fall asleep. Avoid or limit caffeine consumption. Limit screen use, especially two hours prior to bed (this includes watching TV, using smartphone, tablet or computer).     Ana reports overall significant improvement in symptoms associated with both depression and anxiety with current medication regimen.  Reports that she is continuing to work on tapering clonazepam and feels that she is doing well with the taper.  Denies any adverse effects of current medication regimen.  Agreeable to continue with all medications as previously prescribed, continuing to work on clonazepam taper.  Currently has decreased clonazepam use to about 3 times per week, previous use was twice  daily.  Encouraged her to continue with decreasing medication and continue with clonazepam only as needed.  Denies any panic episodes since last visit. Denies any adverse effects of medication.  Adamantly denies any SI/HI or AVH.    -Refill Abilify 10 mg daily for depression and mood stabilization  -Refill Prozac 40 mg daily for anxiety and depression  -Continue hydroxyzine 25 mg as needed 3 times daily for anxiety.  -Refill clonazepam 0.5 mg daily as needed for anxiety as previously prescribed by PCP (Rx decreased from twice daily to once daily as needed as she has been self-tapering at home)  -Continue trazodone 75 mg at night for depression and sleep     GOALS:  Short Term Goals: Patient will be compliant with medication, and patient will have no significant medication related side effects.  Patient will be engaged in psychotherapy as indicated.  Patient will report subjective improvement of symptoms.  Long term goals: To stabilize mood and treat/improve subjective symptoms, the patient will stay out of the hospital, the patient will be at an optimal level of functioning, and the patient will take all medications as prescribed.  The patient/guardian verbalized understanding and agreement with goals that were mutually set.    TREATMENT PLAN: Continue supportive psychotherapy efforts and medications as indicated for patient's diagnosis.  Pharmacological and Non-Pharmacological treatment options discussed during today's visit. Patient/Guardian acknowledged and verbally consented with current treatment plan and was educated on the importance of compliance with treatment and follow-up appointments.      MEDICATION ISSUES:  Discussed medication options and treatment plan of prescribed medication as well as the risks, benefits, any black box warnings, and side effects including potential falls, possible impaired driving, and metabolic adversities among others. Patient is agreeable to call the office with any worsening  of symptoms or onset of side effects, or if any concerns or questions arise.  The contact information for the office is made available to the patient. Patient is agreeable to call 911 or go to the nearest ER should they begin having any SI/HI, or if any urgent concerns arise. No medication side effects or related complaints today.     MEDS ORDERED DURING VISIT:  New Medications Ordered This Visit   Medications   • ARIPiprazole (ABILIFY) 10 MG tablet     Sig: Take 1 tablet by mouth Daily.     Dispense:  30 tablet     Refill:  2   • FLUoxetine (PROzac) 40 MG capsule     Sig: Take 1 capsule by mouth Daily.     Dispense:  30 capsule     Refill:  2   • clonazePAM (KlonoPIN) 0.5 MG tablet     Sig: Take 1 tablet by mouth Daily As Needed for Anxiety.     Dispense:  30 tablet     Refill:  0       FOLLOW UP:  Return in about 3 months (around 6/10/2023) for Recheck.             This document has been electronically signed by SALOMÓN Choi  March 10, 2023 13:18 EST    Please note that portions of this note were completed with a voice recognition program. Efforts were made to edit dictation, but occasionally words are mistranscribed.

## 2023-05-05 ENCOUNTER — OFFICE VISIT (OUTPATIENT)
Dept: PSYCHIATRY | Facility: CLINIC | Age: 58
End: 2023-05-05
Payer: COMMERCIAL

## 2023-05-05 VITALS
WEIGHT: 238 LBS | BODY MASS INDEX: 38.25 KG/M2 | SYSTOLIC BLOOD PRESSURE: 116 MMHG | DIASTOLIC BLOOD PRESSURE: 82 MMHG | HEIGHT: 66 IN | HEART RATE: 74 BPM

## 2023-05-05 DIAGNOSIS — F33.40 RECURRENT MAJOR DEPRESSION IN REMISSION: Primary | ICD-10-CM

## 2023-05-05 DIAGNOSIS — F41.1 GENERALIZED ANXIETY DISORDER: ICD-10-CM

## 2023-05-05 RX ORDER — ARIPIPRAZOLE 10 MG/1
10 TABLET ORAL DAILY
Qty: 30 TABLET | Refills: 2 | Status: SHIPPED | OUTPATIENT
Start: 2023-05-05

## 2023-05-05 RX ORDER — TRAZODONE HYDROCHLORIDE 150 MG/1
150 TABLET ORAL
Qty: 30 TABLET | Refills: 2 | Status: SHIPPED | OUTPATIENT
Start: 2023-05-05

## 2023-05-05 RX ORDER — FLUOXETINE HYDROCHLORIDE 40 MG/1
40 CAPSULE ORAL DAILY
Qty: 30 CAPSULE | Refills: 2 | Status: SHIPPED | OUTPATIENT
Start: 2023-05-05

## 2023-05-05 NOTE — PROGRESS NOTES
Subjective   Ana Samaniego is a 57 y.o. female who presents today for follow up    Chief Complaint: Depression, anxiety and panic attacks    History of Present Illness:   History of Present Illness  Ana Samaniego presents today for medication management follow-up.  Currently taking Abilify, Prozac and trazodone.  Also taking hydroxyzine as needed.  Had previous prescription of clonazepam, slowly tapered medication but has medication on hand only as needed for severe anxiety.  Reports that she is doing well and feels that anxiety and depression are adequately controlled with current medication regimen.  Denies any symptoms associated with depression.  Reports that she also feels anxiety is only situational, but no symptoms currently present.  Says that she is sleeping well, obtaining about 8 to 9 hours per night.  Reports appetite is good.  Says that her PCP recently retired, now is looking for a new PCP.  Denies any SI/HI.  PHQ-9 total score: 3, ES-7 total score: 0.  Previous Psych Meds: Reports having tried several past psychiatric medications, but verbalizes she does not recall specific names.     The following portions of the patient's history were reviewed and updated as appropriate: allergies, current medications, past family history, past medical history, past social history, past surgical history and problem list.      Past Medical History:  Past Medical History:   Diagnosis Date   • Abnormal Pap smear of cervix    • Acid reflux    • Anxiety    • Asthma, extrinsic    • Bronchitis    • Depression    • Diabetes    • GERD (gastroesophageal reflux disease)    • Gestational diabetes    • Gestational hypertension    • HPV (human papilloma virus) infection    • Hypertension    • Pleurisy        Social History:  Social History     Socioeconomic History   • Marital status:    Tobacco Use   • Smoking status: Former     Packs/day: 1.00     Years: 20.00     Pack years: 20.00     Types: Cigarettes      Quit date: 6/21/2012     Years since quitting: 10.8     Passive exposure: Past   • Smokeless tobacco: Never   Vaping Use   • Vaping Use: Never used   Substance and Sexual Activity   • Alcohol use: No   • Drug use: No   • Sexual activity: Defer     Partners: Male     Birth control/protection: Surgical       Family History:  Family History   Problem Relation Age of Onset   • Heart attack Mother    • Emphysema Father        Past Surgical History:  Past Surgical History:   Procedure Laterality Date   • COLONOSCOPY N/A 6/28/2017    Procedure: COLONOSCOPY;  Surgeon: Abdi Del Castillo MD;  Location: [facility] ENDOSCOPY;  Service:    • GALLBLADDER SURGERY     • HYSTERECTOMY     • RECTOVAGINAL FISTULA REPAIR     • TUBAL ABDOMINAL LIGATION     • VEIN LIGATION AND STRIPPING Right        Problem List:  There is no problem list on file for this patient.      Allergy:   No Known Allergies     Current Medications:   Current Outpatient Medications   Medication Sig Dispense Refill   • amLODIPine (NORVASC) 10 MG tablet      • ARIPiprazole (ABILIFY) 10 MG tablet Take 1 tablet by mouth Daily. 30 tablet 2   • Calcium Carbonate-Vitamin D (CALCIUM PLUS VITAMIN D PO) Take 1 tablet by mouth Daily.     • cetirizine (zyrTEC) 10 MG tablet TAKE 1 TABLET BY MOUTH ONE TIME A DAY  2   • clonazePAM (KlonoPIN) 0.5 MG tablet Take 1 tablet by mouth Daily As Needed for Anxiety. 30 tablet 0   • diclofenac (VOLTAREN) 75 MG EC tablet Take 1 tablet by mouth 2 (Two) Times a Day.     • FLUoxetine (PROzac) 40 MG capsule Take 1 capsule by mouth Daily. 30 capsule 2   • fluticasone (FLONASE) 50 MCG/ACT nasal spray INHALE 1 SPRAY IN EACH NOSTRIL ONE TIME A DAY-USES PRN  2   • hydrOXYzine (ATARAX) 25 MG tablet Take 1 tablet by mouth 3 (Three) Times a Day As Needed for Itching.     • Jardiance 25 MG tablet tablet Take 1 tablet by mouth Daily.     • Loratadine 10 MG capsule Take 10 capsules by mouth.     • meloxicam (MOBIC) 15 MG tablet Take 1 tablet by mouth Daily.    "  • methocarbamol (ROBAXIN) 750 MG tablet Take 1 tablet by mouth Every Night.     • montelukast (SINGULAIR) 10 MG tablet Take 1 tablet by mouth Every Night.     • omeprazole (priLOSEC) 20 MG capsule Take 1 capsule by mouth Every Morning.     • PROAIR  (90 BASE) MCG/ACT inhaler INHALE 2 PUFFS BY MOUTH FOUR TIMES A DAY AS NEEDED  2   • SITagliptin-metFORMIN HCl ER (Janumet XR)  MG tablet Take 1 tablet by mouth Daily.     • traZODone (DESYREL) 150 MG tablet Take 1 tablet by mouth every night at bedtime. 30 tablet 2     No current facility-administered medications for this visit.       Review of Symptoms:    Review of Systems   Constitutional: Negative for activity change, appetite change, unexpected weight gain and unexpected weight loss.   Respiratory: Negative for shortness of breath.    Cardiovascular: Negative for chest pain.   Psychiatric/Behavioral: Positive for decreased concentration, sleep disturbance, depressed mood and stress. Negative for hallucinations and suicidal ideas. The patient is nervous/anxious.      Physical Exam:   Physical Exam  Vitals reviewed.   Constitutional:       General: She is not in acute distress.     Appearance: Normal appearance.   Neurological:      Mental Status: She is alert.      Gait: Gait normal.     Vitals:   Blood pressure 116/82, pulse 74, height 167.6 cm (66\"), weight 108 kg (238 lb), not currently breastfeeding.    Mental Status Exam:   Hygiene:   good  Cooperation:  Cooperative  Eye Contact:  Good  Psychomotor Behavior:  Appropriate  Affect:  Appropriate  Mood: normal  Hopelessness: Denies  Speech:  Normal  Thought Process:  Goal directed and Linear  Thought Content:  Mood congruent  Suicidal:  None  Homicidal:  None  Hallucinations:  None  Delusion:  None  Memory:  Intact  Orientation:  Person, Place, Time and Situation  Reliability:  good  Insight:  Good  Judgement:  Good  Impulse Control:  Good    Lab Results:   Lab on 01/13/2023   Component Date Value Ref " Range Status   • THC, Screen, Urine 01/13/2023 Negative  Negative Final   • Phencyclidine (PCP), Urine 01/13/2023 Negative  Negative Final   • Cocaine Screen, Urine 01/13/2023 Negative  Negative Final   • Methamphetamine, Ur 01/13/2023 Negative  Negative Final   • Opiate Screen 01/13/2023 Negative  Negative Final   • Amphetamine Screen, Urine 01/13/2023 Negative  Negative Final   • Benzodiazepine Screen, Urine 01/13/2023 Negative  Negative Final   • Tricyclic Antidepressants Screen 01/13/2023 Negative  Negative Final   • Methadone Screen, Urine 01/13/2023 Negative  Negative Final   • Barbiturates Screen, Urine 01/13/2023 Negative  Negative Final   • Oxycodone Screen, Urine 01/13/2023 Negative  Negative Final   • Propoxyphene Screen 01/13/2023 Negative  Negative Final   • Buprenorphine, Screen, Urine 01/13/2023 Negative  Negative Final       EKG Results:  No orders to display       Assessment & Plan   Problems Addressed this Visit    None  Visit Diagnoses     Recurrent major depression in remission    -  Primary    Relevant Medications    ARIPiprazole (ABILIFY) 10 MG tablet    FLUoxetine (PROzac) 40 MG capsule    traZODone (DESYREL) 150 MG tablet    Generalized anxiety disorder        Relevant Medications    ARIPiprazole (ABILIFY) 10 MG tablet    FLUoxetine (PROzac) 40 MG capsule    traZODone (DESYREL) 150 MG tablet      Diagnoses       Codes Comments    Recurrent major depression in remission    -  Primary ICD-10-CM: F33.40  ICD-9-CM: 296.35     Generalized anxiety disorder     ICD-10-CM: F41.1  ICD-9-CM: 300.02           Visit Diagnoses:    ICD-10-CM ICD-9-CM   1. Recurrent major depression in remission  F33.40 296.35   2. Generalized anxiety disorder  F41.1 300.02       Ana Cavanaugh presents today for medication management follow-up. Verbalizes that she has noticed significant improvement in anxiety and depression with current medication regimen. Currently denies any symptoms associated with anxiety or depression.  Discussed plan of care and medication regimen, agreeable to continue with Abilify, Prozac and trazodone daily. Will also continue with hydroxyzine as needed and clonazepam for as needed only for severe anxiety (was previously on daily clonazepam, tapered to as needed use).  Denies any adverse effects to current medication regimen.    -Refill Abilify 10 mg daily for depression and mood stabilization  -Refill Prozac 40 mg daily for anxiety and depression  -Continue hydroxyzine 25 mg as needed 3 times daily for anxiety.  -Refill clonazepam 0.5 mg daily as needed for anxiety as previously prescribed by PCP (Rx decreased from twice daily to once daily as needed as she has been self-tapering at home)  -Continue trazodone 150 mg at night for depression and sleep      -Reviewed previous available documentation and most recent available labs.   ALANNA reviewed and is appropriate.      GOALS:  Short Term Goals: Patient will be compliant with medication, and patient will have no significant medication related side effects.  Patient will be engaged in psychotherapy as indicated.  Patient will report subjective improvement of symptoms.  Long term goals: To stabilize mood and treat/improve subjective symptoms, the patient will stay out of the hospital, the patient will be at an optimal level of functioning, and the patient will take all medications as prescribed.  The patient/guardian verbalized understanding and agreement with goals that were mutually set.    TREATMENT PLAN: Continue supportive psychotherapy efforts and medications as indicated for patient's diagnosis.  Pharmacological and Non-Pharmacological treatment options discussed during today's visit. Patient/Guardian acknowledged and verbally consented with current treatment plan and was educated on the importance of compliance with treatment and follow-up appointments.      MEDICATION ISSUES:  Discussed medication options and treatment plan of prescribed medication as  well as the risks, benefits, any black box warnings, and side effects including potential falls, possible impaired driving, and metabolic adversities among others. Patient is agreeable to call the office with any worsening of symptoms or onset of side effects, or if any concerns or questions arise.  The contact information for the office is made available to the patient. Patient is agreeable to call 911 or go to the nearest ER should they begin having any SI/HI, or if any urgent concerns arise. No medication side effects or related complaints today.     MEDS ORDERED DURING VISIT:  New Medications Ordered This Visit   Medications   • ARIPiprazole (ABILIFY) 10 MG tablet     Sig: Take 1 tablet by mouth Daily.     Dispense:  30 tablet     Refill:  2   • FLUoxetine (PROzac) 40 MG capsule     Sig: Take 1 capsule by mouth Daily.     Dispense:  30 capsule     Refill:  2   • traZODone (DESYREL) 150 MG tablet     Sig: Take 1 tablet by mouth every night at bedtime.     Dispense:  30 tablet     Refill:  2       FOLLOW UP:  Return in about 3 months (around 8/5/2023) for Recheck.             This document has been electronically signed by SALOMÓN Choi  May 5, 2023 10:32 EDT    Please note that portions of this note were completed with a voice recognition program. Efforts were made to edit dictation, but occasionally words are mistranscribed.

## 2023-05-29 DIAGNOSIS — F33.40 RECURRENT MAJOR DEPRESSION IN REMISSION: ICD-10-CM

## 2023-05-30 RX ORDER — ARIPIPRAZOLE 10 MG/1
TABLET ORAL
Qty: 30 TABLET | Refills: 0 | OUTPATIENT
Start: 2023-05-30

## 2023-07-28 ENCOUNTER — OFFICE VISIT (OUTPATIENT)
Dept: PSYCHIATRY | Facility: CLINIC | Age: 58
End: 2023-07-28
Payer: COMMERCIAL

## 2023-07-28 VITALS
DIASTOLIC BLOOD PRESSURE: 78 MMHG | HEART RATE: 68 BPM | SYSTOLIC BLOOD PRESSURE: 108 MMHG | BODY MASS INDEX: 37.77 KG/M2 | WEIGHT: 235 LBS | HEIGHT: 66 IN

## 2023-07-28 DIAGNOSIS — F33.0 MILD EPISODE OF RECURRENT MAJOR DEPRESSIVE DISORDER: ICD-10-CM

## 2023-07-28 DIAGNOSIS — F41.1 GENERALIZED ANXIETY DISORDER: Primary | ICD-10-CM

## 2023-07-28 DIAGNOSIS — G47.09 OTHER INSOMNIA: ICD-10-CM

## 2023-07-28 RX ORDER — FLUOXETINE HYDROCHLORIDE 40 MG/1
40 CAPSULE ORAL DAILY
Qty: 30 CAPSULE | Refills: 2 | Status: SHIPPED | OUTPATIENT
Start: 2023-07-28

## 2023-07-28 RX ORDER — ARIPIPRAZOLE 10 MG/1
10 TABLET ORAL DAILY
Qty: 30 TABLET | Refills: 2 | Status: SHIPPED | OUTPATIENT
Start: 2023-07-28

## 2023-07-28 RX ORDER — TRAZODONE HYDROCHLORIDE 150 MG/1
150 TABLET ORAL
Qty: 30 TABLET | Refills: 2 | Status: SHIPPED | OUTPATIENT
Start: 2023-07-28

## 2023-07-28 RX ORDER — CLONAZEPAM 0.5 MG/1
0.5 TABLET ORAL DAILY PRN
Qty: 15 TABLET | Refills: 0 | Status: SHIPPED | OUTPATIENT
Start: 2023-07-28

## 2023-07-28 NOTE — PROGRESS NOTES
Subjective   Ana Samaniego is a 57 y.o. female who presents today for follow up    Chief Complaint: Depression, anxiety and panic attacks    History of Present Illness:   History of Present Illness  Ana Samaniego presents today for medication management follow-up.  Taking Abilify 10 mg daily, Prozac 40 mg daily and trazodone 150 mg nightly.  Reports increased anxiety, possibly related to increased stressors at home.  Has been recently had hip replacement surgery and will be off work until October.  While he is off work, he is only receiving 60% of his pay.  Says that she has been more anxious, restless and worries.  Admits that she has also been struggling with motivation and feeling more down. Rates overall depression 5/10, rates anxiety 3/10 on a scale of 0-10 with 10 being the worst.  Says that anxiety often increases at home and when driving long distances.  Says that she continues to sleep well with trazodone, obtaining approximately 8 hours of sleep per night.  Denies any appetite changes.  Denies any SI/HI or AVH.  PHQ-9 total score: 8 (previously 3), ES-7 total score: 15 (previously 0).  Previous Psych Meds: Reports having tried several past psychiatric medications, but verbalizes she does not recall specific names.     The following portions of the patient's history were reviewed and updated as appropriate: allergies, current medications, past family history, past medical history, past social history, past surgical history and problem list.      Past Medical History:  Past Medical History:   Diagnosis Date    Abnormal Pap smear of cervix     Acid reflux     Anxiety     Asthma, extrinsic     Bronchitis     Depression     Diabetes     GERD (gastroesophageal reflux disease)     Gestational diabetes     Gestational hypertension     HPV (human papilloma virus) infection     Hypertension     Pleurisy        Social History:  Social History     Socioeconomic History    Marital status:     Tobacco Use    Smoking status: Former     Packs/day: 1.00     Years: 20.00     Pack years: 20.00     Types: Cigarettes     Quit date: 2012     Years since quittin.1     Passive exposure: Past    Smokeless tobacco: Never   Vaping Use    Vaping Use: Never used   Substance and Sexual Activity    Alcohol use: Never    Drug use: Never    Sexual activity: Defer     Partners: Male     Birth control/protection: Surgical       Family History:  Family History   Problem Relation Age of Onset    Heart attack Mother     Emphysema Father        Past Surgical History:  Past Surgical History:   Procedure Laterality Date    COLONOSCOPY N/A 2017    Procedure: COLONOSCOPY;  Surgeon: Abdi Del Castillo MD;  Location: [facility];  Service:     GALLBLADDER SURGERY      HYSTERECTOMY      RECTOVAGINAL FISTULA REPAIR      TUBAL ABDOMINAL LIGATION      VEIN LIGATION AND STRIPPING Right        Problem List:  There is no problem list on file for this patient.      Allergy:   No Known Allergies     Current Medications:   Current Outpatient Medications   Medication Sig Dispense Refill    amLODIPine (NORVASC) 10 MG tablet       ARIPiprazole (ABILIFY) 10 MG tablet Take 1 tablet by mouth Daily. 30 tablet 2    Calcium Carbonate-Vitamin D (CALCIUM PLUS VITAMIN D PO) Take 1 tablet by mouth Daily.      cetirizine (zyrTEC) 10 MG tablet TAKE 1 TABLET BY MOUTH ONE TIME A DAY  2    clonazePAM (KlonoPIN) 0.5 MG tablet Take 1 tablet by mouth Daily As Needed for Anxiety. 15 tablet 0    diclofenac (VOLTAREN) 75 MG EC tablet Take 1 tablet by mouth 2 (Two) Times a Day.      FLUoxetine (PROzac) 40 MG capsule Take 1 capsule by mouth Daily. 30 capsule 2    fluticasone (FLONASE) 50 MCG/ACT nasal spray INHALE 1 SPRAY IN EACH NOSTRIL ONE TIME A DAY-USES PRN  2    Jardiance 25 MG tablet tablet Take 1 tablet by mouth Daily.      Loratadine 10 MG capsule Take 10 capsules by mouth.      meloxicam (MOBIC) 15 MG tablet Take 1 tablet by mouth Daily.    "   methocarbamol (ROBAXIN) 750 MG tablet Take 1 tablet by mouth Every Night.      montelukast (SINGULAIR) 10 MG tablet Take 1 tablet by mouth Every Night.      omeprazole (priLOSEC) 20 MG capsule Take 1 capsule by mouth Every Morning.      PROAIR  (90 BASE) MCG/ACT inhaler INHALE 2 PUFFS BY MOUTH FOUR TIMES A DAY AS NEEDED  2    traZODone (DESYREL) 150 MG tablet Take 1 tablet by mouth every night at bedtime. 30 tablet 2     No current facility-administered medications for this visit.     Review of Systems   Constitutional:  Negative for activity change, appetite change, unexpected weight gain and unexpected weight loss.   Respiratory:  Negative for shortness of breath.    Cardiovascular:  Negative for chest pain.   Psychiatric/Behavioral:  Positive for sleep disturbance, depressed mood and stress. Negative for suicidal ideas. The patient is nervous/anxious.      Physical Exam  Vitals reviewed.   Constitutional:       General: She is not in acute distress.     Appearance: Normal appearance.   Neurological:      Mental Status: She is alert.      Gait: Gait normal.     Vitals:   Blood pressure 108/78, pulse 68, height 167.6 cm (66\"), weight 107 kg (235 lb), not currently breastfeeding.    Mental Status Exam:   Hygiene:   good  Cooperation:  Cooperative  Eye Contact:  Good  Psychomotor Behavior:  Appropriate  Affect:  Appropriate  Mood: normal  Hopelessness: Denies  Speech:  Normal  Thought Process:  Goal directed and Linear  Thought Content:  Mood congruent  Suicidal:  None  Homicidal:  None  Hallucinations:  None  Delusion:  None  Memory:  Intact  Orientation:  Person, Place, Time, and Situation  Reliability:  good  Insight:  Good  Judgement:  Good  Impulse Control:  Good    Lab Results:   No visits with results within 6 Month(s) from this visit.   Latest known visit with results is:   Lab on 01/13/2023   Component Date Value Ref Range Status    THC, Screen, Urine 01/13/2023 Negative  Negative Final    " Phencyclidine (PCP), Urine 01/13/2023 Negative  Negative Final    Cocaine Screen, Urine 01/13/2023 Negative  Negative Final    Methamphetamine, Ur 01/13/2023 Negative  Negative Final    Opiate Screen 01/13/2023 Negative  Negative Final    Amphetamine Screen, Urine 01/13/2023 Negative  Negative Final    Benzodiazepine Screen, Urine 01/13/2023 Negative  Negative Final    Tricyclic Antidepressants Screen 01/13/2023 Negative  Negative Final    Methadone Screen, Urine 01/13/2023 Negative  Negative Final    Barbiturates Screen, Urine 01/13/2023 Negative  Negative Final    Oxycodone Screen, Urine 01/13/2023 Negative  Negative Final    Propoxyphene Screen 01/13/2023 Negative  Negative Final    Buprenorphine, Screen, Urine 01/13/2023 Negative  Negative Final       EKG Results:  No orders to display       Assessment & Plan   Problems Addressed this Visit    None  Visit Diagnoses       Generalized anxiety disorder    -  Primary    Relevant Medications    clonazePAM (KlonoPIN) 0.5 MG tablet    ARIPiprazole (ABILIFY) 10 MG tablet    FLUoxetine (PROzac) 40 MG capsule    traZODone (DESYREL) 150 MG tablet    Mild episode of recurrent major depressive disorder        Relevant Medications    ARIPiprazole (ABILIFY) 10 MG tablet    FLUoxetine (PROzac) 40 MG capsule    traZODone (DESYREL) 150 MG tablet    Other insomnia        Relevant Medications    traZODone (DESYREL) 150 MG tablet          Diagnoses         Codes Comments    Generalized anxiety disorder    -  Primary ICD-10-CM: F41.1  ICD-9-CM: 300.02     Mild episode of recurrent major depressive disorder     ICD-10-CM: F33.0  ICD-9-CM: 296.31     Other insomnia     ICD-10-CM: G47.09  ICD-9-CM: 780.52             Visit Diagnoses:    ICD-10-CM ICD-9-CM   1. Generalized anxiety disorder  F41.1 300.02   2. Mild episode of recurrent major depressive disorder  F33.0 296.31   3. Other insomnia  G47.09 780.52     Ana Cavanaugh presents today for medication management follow-up.  She reports  increasing symptoms associated with both anxiety and depression due to increased stressors at home.  PHQ-9 score of 8 indicating mild depression, ES-7 score of 15 indicating severe anxiety.  Discussed medication regimen/options.  She is agreeable to continue with Abilify, Prozac and trazodone as previously prescribed.  Will provide clonazepam as needed for severe anxiety as she was previously prescribed this medication in the past that worked well.  Denies any adverse effects of current medication regimen.    -Continue Abilify 10 mg daily for depression and mood stabilization  -Continue Prozac 40 mg daily for anxiety and depression  -Continue trazodone 150 mg at night for depression and sleep  -Refill clonazepam 0.5 mg daily as needed for anxiety (#15 provided for 30-day supply)     -Reviewed previous available documentation and most recent available labs.   ALANNA reviewed and is appropriate.      GOALS:  Short Term Goals: Patient will be compliant with medication, and patient will have no significant medication related side effects.  Patient will be engaged in psychotherapy as indicated.  Patient will report subjective improvement of symptoms.  Long term goals: To stabilize mood and treat/improve subjective symptoms, the patient will stay out of the hospital, the patient will be at an optimal level of functioning, and the patient will take all medications as prescribed.  The patient/guardian verbalized understanding and agreement with goals that were mutually set.    TREATMENT PLAN: Continue supportive psychotherapy efforts and medications as indicated for patient's diagnosis.  Pharmacological and Non-Pharmacological treatment options discussed during today's visit. Patient/Guardian acknowledged and verbally consented with current treatment plan and was educated on the importance of compliance with treatment and follow-up appointments.      MEDICATION ISSUES:  Discussed medication options and treatment plan of  prescribed medication as well as the risks, benefits, any black box warnings, and side effects including potential falls, possible impaired driving, and metabolic adversities among others. Patient is agreeable to call the office with any worsening of symptoms or onset of side effects, or if any concerns or questions arise.  The contact information for the office is made available to the patient. Patient is agreeable to call 911 or go to the nearest ER should they begin having any SI/HI, or if any urgent concerns arise. No medication side effects or related complaints today.     MEDS ORDERED DURING VISIT:  New Medications Ordered This Visit   Medications    clonazePAM (KlonoPIN) 0.5 MG tablet     Sig: Take 1 tablet by mouth Daily As Needed for Anxiety.     Dispense:  15 tablet     Refill:  0    ARIPiprazole (ABILIFY) 10 MG tablet     Sig: Take 1 tablet by mouth Daily.     Dispense:  30 tablet     Refill:  2    FLUoxetine (PROzac) 40 MG capsule     Sig: Take 1 capsule by mouth Daily.     Dispense:  30 capsule     Refill:  2    traZODone (DESYREL) 150 MG tablet     Sig: Take 1 tablet by mouth every night at bedtime.     Dispense:  30 tablet     Refill:  2       FOLLOW UP:  Return in about 3 months (around 10/28/2023), or if symptoms worsen or fail to improve, for Recheck.             This document has been electronically signed by SALOMÓN Choi  July 28, 2023 14:23 EDT    Please note that portions of this note were completed with a voice recognition program. Efforts were made to edit dictation, but occasionally words are mistranscribed.

## 2023-10-27 ENCOUNTER — HOSPITAL ENCOUNTER (OUTPATIENT)
Dept: GENERAL RADIOLOGY | Facility: HOSPITAL | Age: 58
Discharge: HOME | End: 2023-10-27
Admitting: NURSE PRACTITIONER
Payer: COMMERCIAL

## 2023-10-27 ENCOUNTER — OFFICE VISIT (OUTPATIENT)
Dept: PSYCHIATRY | Facility: CLINIC | Age: 58
End: 2023-10-27
Payer: COMMERCIAL

## 2023-10-27 ENCOUNTER — TRANSCRIBE ORDERS (OUTPATIENT)
Dept: GENERAL RADIOLOGY | Facility: HOSPITAL | Age: 58
End: 2023-10-27
Payer: COMMERCIAL

## 2023-10-27 VITALS
HEART RATE: 83 BPM | WEIGHT: 244.2 LBS | SYSTOLIC BLOOD PRESSURE: 120 MMHG | HEIGHT: 66 IN | OXYGEN SATURATION: 98 % | BODY MASS INDEX: 39.25 KG/M2 | DIASTOLIC BLOOD PRESSURE: 82 MMHG

## 2023-10-27 DIAGNOSIS — R05.3 CHRONIC COUGH: ICD-10-CM

## 2023-10-27 DIAGNOSIS — G47.09 OTHER INSOMNIA: ICD-10-CM

## 2023-10-27 DIAGNOSIS — F33.0 MILD EPISODE OF RECURRENT MAJOR DEPRESSIVE DISORDER: ICD-10-CM

## 2023-10-27 DIAGNOSIS — R05.3 CHRONIC COUGH: Primary | ICD-10-CM

## 2023-10-27 DIAGNOSIS — F41.1 GENERALIZED ANXIETY DISORDER: ICD-10-CM

## 2023-10-27 PROCEDURE — 71046 X-RAY EXAM CHEST 2 VIEWS: CPT

## 2023-10-27 RX ORDER — LORATADINE 10 MG/1
1 TABLET ORAL DAILY
COMMUNITY
Start: 2023-10-07 | End: 2023-10-27

## 2023-10-27 RX ORDER — TRAZODONE HYDROCHLORIDE 100 MG/1
150 TABLET ORAL
Qty: 45 TABLET | Refills: 2 | Status: SHIPPED | OUTPATIENT
Start: 2023-10-27

## 2023-10-27 RX ORDER — DEXTROMETHORPHAN HYDROBROMIDE AND PROMETHAZINE HYDROCHLORIDE 15; 6.25 MG/5ML; MG/5ML
SYRUP ORAL
COMMUNITY
Start: 2023-09-12 | End: 2023-10-27

## 2023-10-27 RX ORDER — LEVOCETIRIZINE DIHYDROCHLORIDE 5 MG/1
5 TABLET, FILM COATED ORAL DAILY
COMMUNITY
Start: 2023-10-24

## 2023-10-27 RX ORDER — MONTELUKAST SODIUM 10 MG/1
10 TABLET ORAL DAILY
COMMUNITY
Start: 2023-09-12

## 2023-10-27 RX ORDER — LORATADINE 10 MG/1
10 TABLET ORAL DAILY
COMMUNITY

## 2023-10-27 RX ORDER — ALBUTEROL SULFATE 2.5 MG/3ML
2.5 SOLUTION RESPIRATORY (INHALATION)
COMMUNITY
Start: 2023-10-24

## 2023-10-27 RX ORDER — FLUOXETINE HYDROCHLORIDE 40 MG/1
40 CAPSULE ORAL DAILY
Qty: 30 CAPSULE | Refills: 2 | Status: SHIPPED | OUTPATIENT
Start: 2023-10-27

## 2023-10-27 RX ORDER — AMOXICILLIN 875 MG/1
875 TABLET, COATED ORAL
COMMUNITY
Start: 2023-10-24

## 2023-10-27 RX ORDER — ARIPIPRAZOLE 10 MG/1
10 TABLET ORAL DAILY
Qty: 30 TABLET | Refills: 2 | Status: SHIPPED | OUTPATIENT
Start: 2023-10-27

## 2023-10-27 RX ORDER — METHOCARBAMOL 750 MG/1
750 TABLET, FILM COATED ORAL DAILY
COMMUNITY
Start: 2023-09-12

## 2023-10-27 NOTE — PROGRESS NOTES
Subjective   Ana Samaniego is a 58 y.o. female who presents today for follow up    Chief Complaint: Depression, anxiety and panic attacks    History of Present Illness:   History of Present Illness  Ana Samaniego presents today for medication management follow-up.  Currently taking Prozac 40 mg daily, Abilify 10 mg daily and trazodone 150 mg nightly. Also has clonazepam 0.5 mg daily as needed for severe anxiety. Reports overall improvement in anxiety and depression since last visit.  Says that she has only utilized clonazepam on one occasion since last refill in July.  Recently established with mood PCP and had routine labs, says that she was told all labs were WNL. Reports sleeping well at night, experiences daytime fatigue and decrease in motivation. Obtains approximately 8 hours of sleep each night. Reports appetite is good. Has history of diabetes, not monitoring blood sugar at home routinely.  Denies any SI/HI.  PHQ-9 total score: 9, ES-7 total score: 0.    Previous Psych Meds: Reports having tried several past psychiatric medications, but verbalizes she does not recall specific names.     The following portions of the patient's history were reviewed and updated as appropriate: allergies, current medications, past family history, past medical history, past social history, past surgical history and problem list.      Past Medical History:  Past Medical History:   Diagnosis Date    Abnormal Pap smear of cervix     Acid reflux     Anxiety     Asthma, extrinsic     Bronchitis     Depression     Diabetes     GERD (gastroesophageal reflux disease)     Gestational diabetes     Gestational hypertension     HPV (human papilloma virus) infection     Hypertension     Pleurisy        Social History:  Social History     Socioeconomic History    Marital status:    Tobacco Use    Smoking status: Former     Packs/day: 1.00     Years: 20.00     Additional pack years: 0.00     Total pack years: 20.00      Types: Cigarettes     Quit date: 2012     Years since quittin.3     Passive exposure: Past    Smokeless tobacco: Never   Vaping Use    Vaping Use: Never used   Substance and Sexual Activity    Alcohol use: Never    Drug use: Never    Sexual activity: Defer     Partners: Male     Birth control/protection: Surgical       Family History:  Family History   Problem Relation Age of Onset    Heart attack Mother     Emphysema Father        Past Surgical History:  Past Surgical History:   Procedure Laterality Date    COLONOSCOPY N/A 2017    Procedure: COLONOSCOPY;  Surgeon: Abdi Del Castillo MD;  Location: [facility] ENDOSCOPY;  Service:     GALLBLADDER SURGERY      HYSTERECTOMY      RECTOVAGINAL FISTULA REPAIR      TUBAL ABDOMINAL LIGATION      VEIN LIGATION AND STRIPPING Right        Problem List:  There is no problem list on file for this patient.      Allergy:   No Known Allergies     Current Medications:   Current Outpatient Medications   Medication Sig Dispense Refill    albuterol (PROVENTIL) (2.5 MG/3ML) 0.083% nebulizer solution Inhale 2.5 mg.      amLODIPine (NORVASC) 10 MG tablet       amoxicillin (AMOXIL) 875 MG tablet Take 1 tablet by mouth.      ARIPiprazole (ABILIFY) 10 MG tablet Take 1 tablet by mouth Daily. 30 tablet 2    Calcium Carbonate-Vitamin D (CALCIUM PLUS VITAMIN D PO) Take 1 tablet by mouth Daily.      cetirizine (zyrTEC) 10 MG tablet TAKE 1 TABLET BY MOUTH ONE TIME A DAY  2    clonazePAM (KlonoPIN) 0.5 MG tablet Take 1 tablet by mouth Daily As Needed for Anxiety. 15 tablet 0    diclofenac (VOLTAREN) 75 MG EC tablet Take 1 tablet by mouth 2 (Two) Times a Day.      empagliflozin (Jardiance) 25 MG tablet tablet Take 1 tablet by mouth Every Morning.      FLUoxetine (PROzac) 40 MG capsule Take 1 capsule by mouth Daily. 30 capsule 2    fluticasone (FLONASE) 50 MCG/ACT nasal spray INHALE 1 SPRAY IN EACH NOSTRIL ONE TIME A DAY-USES PRN  2    Jardiance 25 MG tablet tablet Take 1 tablet by mouth  "Daily.      levocetirizine (XYZAL) 5 MG tablet Take 1 tablet by mouth Daily.      meloxicam (MOBIC) 15 MG tablet Take 1 tablet by mouth Daily.      methocarbamol (ROBAXIN) 750 MG tablet Take 1 tablet by mouth Daily.      montelukast (SINGULAIR) 10 MG tablet Take 1 tablet by mouth Every Night.      montelukast (SINGULAIR) 10 MG tablet Take 1 tablet by mouth Daily.      omeprazole (priLOSEC) 20 MG capsule Take 1 capsule by mouth Every Morning.      PROAIR  (90 BASE) MCG/ACT inhaler INHALE 2 PUFFS BY MOUTH FOUR TIMES A DAY AS NEEDED  2    traZODone (DESYREL) 100 MG tablet Take 1.5 tablets by mouth every night at bedtime. 45 tablet 2     No current facility-administered medications for this visit.     Review of Systems   Constitutional:  Negative for activity change, appetite change, unexpected weight gain and unexpected weight loss.   Respiratory:  Negative for shortness of breath.    Cardiovascular:  Negative for chest pain.   Psychiatric/Behavioral:  Positive for sleep disturbance, depressed mood and stress. Negative for suicidal ideas. The patient is nervous/anxious.      Physical Exam  Vitals reviewed.   Constitutional:       General: She is not in acute distress.     Appearance: Normal appearance.   Neurological:      Mental Status: She is alert.      Gait: Gait normal.     Vitals:   Blood pressure 120/82, pulse 83, height 167.6 cm (66\"), weight 111 kg (244 lb 3.2 oz), SpO2 98%, not currently breastfeeding.    Mental Status Exam:   Hygiene:   good  Cooperation:  Cooperative  Eye Contact:  Good  Psychomotor Behavior:  Appropriate  Affect:  Appropriate  Mood: normal  Hopelessness: Denies  Speech:  Normal  Thought Process:  Goal directed and Linear  Thought Content:  Mood congruent  Suicidal:  None  Homicidal:  None  Hallucinations:  None  Delusion:  None  Memory:  Intact  Orientation:  Person, Place, Time, and Situation  Reliability:  good  Insight:  Good  Judgement:  Good  Impulse Control:  Good    Lab " Results:   No visits with results within 6 Month(s) from this visit.   Latest known visit with results is:   Lab on 01/13/2023   Component Date Value Ref Range Status    THC, Screen, Urine 01/13/2023 Negative  Negative Final    Phencyclidine (PCP), Urine 01/13/2023 Negative  Negative Final    Cocaine Screen, Urine 01/13/2023 Negative  Negative Final    Methamphetamine, Ur 01/13/2023 Negative  Negative Final    Opiate Screen 01/13/2023 Negative  Negative Final    Amphetamine Screen, Urine 01/13/2023 Negative  Negative Final    Benzodiazepine Screen, Urine 01/13/2023 Negative  Negative Final    Tricyclic Antidepressants Screen 01/13/2023 Negative  Negative Final    Methadone Screen, Urine 01/13/2023 Negative  Negative Final    Barbiturates Screen, Urine 01/13/2023 Negative  Negative Final    Oxycodone Screen, Urine 01/13/2023 Negative  Negative Final    Propoxyphene Screen 01/13/2023 Negative  Negative Final    Buprenorphine, Screen, Urine 01/13/2023 Negative  Negative Final       EKG Results:  No orders to display       Assessment & Plan   Problems Addressed this Visit    None  Visit Diagnoses       Mild episode of recurrent major depressive disorder        Relevant Medications    ARIPiprazole (ABILIFY) 10 MG tablet    traZODone (DESYREL) 100 MG tablet    FLUoxetine (PROzac) 40 MG capsule    Other insomnia        Relevant Medications    traZODone (DESYREL) 100 MG tablet    Generalized anxiety disorder        Relevant Medications    ARIPiprazole (ABILIFY) 10 MG tablet    traZODone (DESYREL) 100 MG tablet    FLUoxetine (PROzac) 40 MG capsule          Diagnoses         Codes Comments    Mild episode of recurrent major depressive disorder     ICD-10-CM: F33.0  ICD-9-CM: 296.31     Other insomnia     ICD-10-CM: G47.09  ICD-9-CM: 780.52     Generalized anxiety disorder     ICD-10-CM: F41.1  ICD-9-CM: 300.02             Visit Diagnoses:    ICD-10-CM ICD-9-CM   1. Mild episode of recurrent major depressive disorder  F33.0  296.31   2. Other insomnia  G47.09 780.52   3. Generalized anxiety disorder  F41.1 300.02     Ana Cavanaugh presents today for medication management follow-up.  Reports that she is doing really well with current medication regimen.  Feels as though overall depression and anxiety are adequately controlled.  PHQ-9 score of 9 indicating mild depression, ES-7 score of 0 indicating no anxiety. She does voice experiencing some daytime fatigue despite sleeping well at night. Says that she is happy with current medication regimen.  Discussed plan and medication regimen/options. Will continue with Abilify 10 mg daily, Prozac 40 mg daily and clonazepam 0.5 mg daily as needed for severe anxiety.  Will also continue with trazodone 150 mg nightly, but encouraged her to try reducing dose to 100 mg nightly as prescribed in the past to see if this helps with daytime fatigue.  Denies any adverse effects of medication regimen.    -Refill Abilify 10 mg daily for depression and mood stabilization  -Refill Prozac 40 mg daily for anxiety and depression  -Refill trazodone 150 mg at night for depression and sleep  -Continue clonazepam 0.5 mg daily as needed for anxiety #15 provided for 30-day supply (no refill needed at this time, last prescription filled 7/28/2023 per Alanna)     -Reviewed previous available documentation and most recent available labs.   ALANNA reviewed and is appropriate.      GOALS:  Short Term Goals: Patient will be compliant with medication, and patient will have no significant medication related side effects.  Patient will be engaged in psychotherapy as indicated.  Patient will report subjective improvement of symptoms.  Long term goals: To stabilize mood and treat/improve subjective symptoms, the patient will stay out of the hospital, the patient will be at an optimal level of functioning, and the patient will take all medications as prescribed.  The patient/guardian verbalized understanding and agreement with goals  that were mutually set.    TREATMENT PLAN: Continue supportive psychotherapy efforts and medications as indicated for patient's diagnosis.  Pharmacological and Non-Pharmacological treatment options discussed during today's visit. Patient/Guardian acknowledged and verbally consented with current treatment plan and was educated on the importance of compliance with treatment and follow-up appointments.      MEDICATION ISSUES:  Discussed medication options and treatment plan of prescribed medication as well as the risks, benefits, any black box warnings, and side effects including potential falls, possible impaired driving, and metabolic adversities among others. Patient is agreeable to call the office with any worsening of symptoms or onset of side effects, or if any concerns or questions arise.  The contact information for the office is made available to the patient. Patient is agreeable to call 911 or go to the nearest ER should they begin having any SI/HI, or if any urgent concerns arise. No medication side effects or related complaints today.     MEDS ORDERED DURING VISIT:  New Medications Ordered This Visit   Medications    ARIPiprazole (ABILIFY) 10 MG tablet     Sig: Take 1 tablet by mouth Daily.     Dispense:  30 tablet     Refill:  2    traZODone (DESYREL) 100 MG tablet     Sig: Take 1.5 tablets by mouth every night at bedtime.     Dispense:  45 tablet     Refill:  2    FLUoxetine (PROzac) 40 MG capsule     Sig: Take 1 capsule by mouth Daily.     Dispense:  30 capsule     Refill:  2       FOLLOW UP:  Return in about 3 months (around 1/27/2024) for Recheck.             This document has been electronically signed by SALOMÓN Choi  October 27, 2023 11:08 EDT    Please note that portions of this note were completed with a voice recognition program. Efforts were made to edit dictation, but occasionally words are mistranscribed.

## 2023-11-29 ENCOUNTER — TRANSCRIBE ORDERS (OUTPATIENT)
Dept: ADMINISTRATIVE | Facility: HOSPITAL | Age: 58
End: 2023-11-29
Payer: COMMERCIAL

## 2023-11-29 DIAGNOSIS — Z12.31 ENCOUNTER FOR SCREENING MAMMOGRAM FOR MALIGNANT NEOPLASM OF BREAST: Primary | ICD-10-CM

## 2024-01-20 DIAGNOSIS — G47.09 OTHER INSOMNIA: ICD-10-CM

## 2024-01-22 RX ORDER — TRAZODONE HYDROCHLORIDE 100 MG/1
TABLET ORAL
Qty: 45 TABLET | Refills: 0 | Status: SHIPPED | OUTPATIENT
Start: 2024-01-22

## 2024-01-25 ENCOUNTER — OFFICE VISIT (OUTPATIENT)
Dept: PSYCHIATRY | Facility: CLINIC | Age: 59
End: 2024-01-25
Payer: COMMERCIAL

## 2024-01-25 VITALS
WEIGHT: 237 LBS | HEART RATE: 76 BPM | BODY MASS INDEX: 38.09 KG/M2 | DIASTOLIC BLOOD PRESSURE: 74 MMHG | OXYGEN SATURATION: 98 % | SYSTOLIC BLOOD PRESSURE: 102 MMHG | HEIGHT: 66 IN

## 2024-01-25 DIAGNOSIS — G47.09 OTHER INSOMNIA: ICD-10-CM

## 2024-01-25 DIAGNOSIS — F33.1 MODERATE EPISODE OF RECURRENT MAJOR DEPRESSIVE DISORDER: Primary | ICD-10-CM

## 2024-01-25 DIAGNOSIS — F41.1 GENERALIZED ANXIETY DISORDER: ICD-10-CM

## 2024-01-25 RX ORDER — FLUTICASONE FUROATE AND VILANTEROL TRIFENATATE 100; 25 UG/1; UG/1
1 POWDER RESPIRATORY (INHALATION) DAILY
COMMUNITY
Start: 2024-01-04

## 2024-01-25 RX ORDER — BUPROPION HYDROCHLORIDE 150 MG/1
150 TABLET ORAL EVERY MORNING
Qty: 30 TABLET | Refills: 1 | Status: SHIPPED | OUTPATIENT
Start: 2024-01-25

## 2024-01-25 NOTE — PROGRESS NOTES
Subjective   Ana Samaniego is a 58 y.o. female who presents today for follow up    Chief Complaint: Depression, anxiety and panic attacks    History of Present Illness:   History of Present Illness  Ana Samaniego presents today for medication management follow-up.  Verbalizes that she is happy with current medication regimen.  She does report increased symptoms associated with depression.  Having low mood, feeling down, decreased motivation and decreased interest in activities.  In the past, she would often wake up in the mornings clean her house and complete all of the tasks that day. Verbalizes that she struggles to initiate or complete basic daily household chores or even self hygiene regimen.  Has some anxiety, but does not feel as anxiety is as much of an issue.  Has trouble falling asleep, not aware of how long it takes to fall asleep if she does not have a clock in her room.  Feels that she sleeps better in the morning hours, sometimes sleeping until 10 to 10:30 AM.  Had a sleep study in the past and was told that she needed a CPAP, but was never able to obtain due to cost.  Verbalizes that she also struggles with a cough that has been present for 8 months or longer, has been referred to allergy and asthma for further evaluation.  Reports appetite is good. Denies any SI/HI or AVH.  PHQ-9 total score: 5, ES-7 total score: 4.    Previous Psych Meds: Reports having tried several past psychiatric medications, but verbalizes she does not recall specific names.     The following portions of the patient's history were reviewed and updated as appropriate: allergies, current medications, past family history, past medical history, past social history, past surgical history and problem list.      Past Medical History:  Past Medical History:   Diagnosis Date    Abnormal Pap smear of cervix     Acid reflux     Anxiety     Asthma, extrinsic     Bronchitis     Depression     Diabetes     GERD  (gastroesophageal reflux disease)     Gestational diabetes     Gestational hypertension     HPV (human papilloma virus) infection     Hypertension     Pleurisy        Social History:  Social History     Socioeconomic History    Marital status:    Tobacco Use    Smoking status: Former     Packs/day: 1.00     Years: 20.00     Additional pack years: 0.00     Total pack years: 20.00     Types: Cigarettes     Quit date: 2012     Years since quittin.6     Passive exposure: Past    Smokeless tobacco: Never   Vaping Use    Vaping Use: Never used   Substance and Sexual Activity    Alcohol use: Never    Drug use: Never    Sexual activity: Defer     Partners: Male     Birth control/protection: Surgical       Family History:  Family History   Problem Relation Age of Onset    Heart attack Mother     Emphysema Father        Past Surgical History:  Past Surgical History:   Procedure Laterality Date    COLONOSCOPY N/A 2017    Procedure: COLONOSCOPY;  Surgeon: Abdi Del Castillo MD;  Location: [facility] ENDOSCOPY;  Service:     GALLBLADDER SURGERY      HYSTERECTOMY      RECTOVAGINAL FISTULA REPAIR      TUBAL ABDOMINAL LIGATION      VEIN LIGATION AND STRIPPING Right        Problem List:  There is no problem list on file for this patient.      Allergy:   No Known Allergies     Current Medications:   Current Outpatient Medications   Medication Sig Dispense Refill    albuterol (PROVENTIL) (2.5 MG/3ML) 0.083% nebulizer solution Inhale 2.5 mg.      amLODIPine (NORVASC) 10 MG tablet       ARIPiprazole (ABILIFY) 10 MG tablet Take 1 tablet by mouth Daily. 30 tablet 2    Breo Ellipta 100-25 MCG/ACT aerosol powder  Inhale 1 puff Daily.      Calcium Carbonate-Vitamin D (CALCIUM PLUS VITAMIN D PO) Take 1 tablet by mouth Daily.      diclofenac (VOLTAREN) 75 MG EC tablet Take 1 tablet by mouth 2 (Two) Times a Day.      FLUoxetine (PROzac) 40 MG capsule Take 1 capsule by mouth Daily. 30 capsule 2    fluticasone (FLONASE) 50  "MCG/ACT nasal spray INHALE 1 SPRAY IN EACH NOSTRIL ONE TIME A DAY-USES PRN  2    Jardiance 25 MG tablet tablet Take 1 tablet by mouth Daily.      levocetirizine (XYZAL) 5 MG tablet Take 1 tablet by mouth Daily.      loratadine (CLARITIN) 10 MG tablet Take 1 tablet by mouth Daily.      methocarbamol (ROBAXIN) 750 MG tablet Take 1 tablet by mouth Daily.      montelukast (SINGULAIR) 10 MG tablet Take 1 tablet by mouth Daily.      omeprazole (priLOSEC) 20 MG capsule Take 1 capsule by mouth Every Morning.      traZODone (DESYREL) 100 MG tablet TAKE 1 & 1/2 (ONE & ONE-HALF) TABLETS BY MOUTH ONCE DAILY AT NIGHT AT BEDTIME 45 tablet 0    buPROPion XL (Wellbutrin XL) 150 MG 24 hr tablet Take 1 tablet by mouth Every Morning. 30 tablet 1    clonazePAM (KlonoPIN) 0.5 MG tablet Take 1 tablet by mouth Daily As Needed for Anxiety. 15 tablet 0    meloxicam (MOBIC) 15 MG tablet Take 1 tablet by mouth Daily.      PROAIR  (90 BASE) MCG/ACT inhaler INHALE 2 PUFFS BY MOUTH FOUR TIMES A DAY AS NEEDED  2     No current facility-administered medications for this visit.     Review of Systems   Constitutional:  Negative for activity change, appetite change, unexpected weight gain and unexpected weight loss.   Respiratory:  Negative for shortness of breath.    Cardiovascular:  Negative for chest pain.   Psychiatric/Behavioral:  Positive for sleep disturbance, depressed mood and stress. Negative for suicidal ideas. The patient is nervous/anxious.      Physical Exam  Vitals reviewed.   Constitutional:       General: She is not in acute distress.     Appearance: Normal appearance.   Neurological:      Mental Status: She is alert.      Gait: Gait normal.     Vitals:   Blood pressure 102/74, pulse 76, height 167.6 cm (66\"), weight 108 kg (237 lb), SpO2 98%, not currently breastfeeding.    Mental Status Exam:   Hygiene:   good  Cooperation:  Cooperative  Eye Contact:  Good  Psychomotor Behavior:  Appropriate  Affect:  Appropriate  Mood: " normal  Hopelessness: Denies  Speech:  Normal  Thought Process:  Goal directed and Linear  Thought Content:  Mood congruent  Suicidal:  None  Homicidal:  None  Hallucinations:  None  Delusion:  None  Memory:  Intact  Orientation:  Person, Place, Time, and Situation  Reliability:  good  Insight:  Good  Judgement:  Good  Impulse Control:  Good    Lab Results:   No visits with results within 6 Month(s) from this visit.   Latest known visit with results is:   Lab on 01/13/2023   Component Date Value Ref Range Status    THC, Screen, Urine 01/13/2023 Negative  Negative Final    Phencyclidine (PCP), Urine 01/13/2023 Negative  Negative Final    Cocaine Screen, Urine 01/13/2023 Negative  Negative Final    Methamphetamine, Ur 01/13/2023 Negative  Negative Final    Opiate Screen 01/13/2023 Negative  Negative Final    Amphetamine Screen, Urine 01/13/2023 Negative  Negative Final    Benzodiazepine Screen, Urine 01/13/2023 Negative  Negative Final    Tricyclic Antidepressants Screen 01/13/2023 Negative  Negative Final    Methadone Screen, Urine 01/13/2023 Negative  Negative Final    Barbiturates Screen, Urine 01/13/2023 Negative  Negative Final    Oxycodone Screen, Urine 01/13/2023 Negative  Negative Final    Propoxyphene Screen 01/13/2023 Negative  Negative Final    Buprenorphine, Screen, Urine 01/13/2023 Negative  Negative Final       EKG Results:  No orders to display       Assessment & Plan   Problems Addressed this Visit    None  Visit Diagnoses       Moderate episode of recurrent major depressive disorder    -  Primary    Relevant Medications    buPROPion XL (Wellbutrin XL) 150 MG 24 hr tablet    Generalized anxiety disorder        Relevant Medications    buPROPion XL (Wellbutrin XL) 150 MG 24 hr tablet    Other insomnia              Diagnoses         Codes Comments    Moderate episode of recurrent major depressive disorder    -  Primary ICD-10-CM: F33.1  ICD-9-CM: 296.32     Generalized anxiety disorder     ICD-10-CM:  F41.1  ICD-9-CM: 300.02     Other insomnia     ICD-10-CM: G47.09  ICD-9-CM: 780.52             Visit Diagnoses:    ICD-10-CM ICD-9-CM   1. Moderate episode of recurrent major depressive disorder  F33.1 296.32   2. Generalized anxiety disorder  F41.1 300.02   3. Other insomnia  G47.09 780.52     Ana Cavanaugh presents today for medication management follow-up.  Feels that current medication regimen is providing adequate relief in symptoms, but does feel there is room for improvement.  Continues to struggle with symptoms of depression and anxiety.  The depressive symptoms have been the most bothersome.  Discussed plan of care and medication regimen/options.  She is agreeable to decrease/taper Abilify as medication has not been effective.  Will start Wellbutrin  mg daily for depression.  Agreeable to continue with Prozac 40 mg daily and trazodone 100 mg nightly as previously prescribed.  Also has past prescription of clonazepam 0.5 mg daily as needed for severe anxiety, last received #15 7/28/2023 per Alanna.  Denies any adverse effects of current medication regimen.  Encouraged her to practice good sleep hygiene.    -Decrease/Taper Abilify from 10 mg to 5 mg daily x 2 weeks then stop   -Start Wellbutrin  mg daily   -Continue Prozac 40 mg daily for anxiety and depression  -Continue trazodone 100 mg at night for depression and sleep  -Continue clonazepam 0.5 mg daily as needed for anxiety #15 provided for 30-day supply (no refill needed at this time, last prescription filled 7/28/2023 per Alanna)     -Reviewed previous available documentation and most recent available labs.   ALANNA reviewed and is appropriate.      GOALS:  Short Term Goals: Patient will be compliant with medication, and patient will have no significant medication related side effects.  Patient will be engaged in psychotherapy as indicated.  Patient will report subjective improvement of symptoms.  Long term goals: To stabilize mood and  treat/improve subjective symptoms, the patient will stay out of the hospital, the patient will be at an optimal level of functioning, and the patient will take all medications as prescribed.  The patient/guardian verbalized understanding and agreement with goals that were mutually set.    TREATMENT PLAN: Continue supportive psychotherapy efforts and medications as indicated for patient's diagnosis.  Pharmacological and Non-Pharmacological treatment options discussed during today's visit. Patient/Guardian acknowledged and verbally consented with current treatment plan and was educated on the importance of compliance with treatment and follow-up appointments.      MEDICATION ISSUES:  Discussed medication options and treatment plan of prescribed medication as well as the risks, benefits, any black box warnings, and side effects including potential falls, possible impaired driving, and metabolic adversities among others. Patient is agreeable to call the office with any worsening of symptoms or onset of side effects, or if any concerns or questions arise.  The contact information for the office is made available to the patient. Patient is agreeable to call 911 or go to the nearest ER should they begin having any SI/HI, or if any urgent concerns arise. No medication side effects or related complaints today.     MEDS ORDERED DURING VISIT:  New Medications Ordered This Visit   Medications    buPROPion XL (Wellbutrin XL) 150 MG 24 hr tablet     Sig: Take 1 tablet by mouth Every Morning.     Dispense:  30 tablet     Refill:  1       FOLLOW UP:  Return in about 8 weeks (around 3/21/2024).             This document has been electronically signed by SALOMÓN Choi  February 8, 2024 13:58 EST    Please note that portions of this note were completed with a voice recognition program. Efforts were made to edit dictation, but occasionally words are mistranscribed.

## 2024-02-14 DIAGNOSIS — F33.0 MILD EPISODE OF RECURRENT MAJOR DEPRESSIVE DISORDER: ICD-10-CM

## 2024-02-14 RX ORDER — ARIPIPRAZOLE 10 MG/1
10 TABLET ORAL DAILY
Qty: 30 TABLET | Refills: 0 | Status: SHIPPED | OUTPATIENT
Start: 2024-02-14

## 2024-02-20 DIAGNOSIS — G47.09 OTHER INSOMNIA: ICD-10-CM

## 2024-02-20 RX ORDER — TRAZODONE HYDROCHLORIDE 100 MG/1
TABLET ORAL
Qty: 45 TABLET | Refills: 0 | Status: SHIPPED | OUTPATIENT
Start: 2024-02-20

## 2024-03-12 ENCOUNTER — HOSPITAL ENCOUNTER (OUTPATIENT)
Dept: MAMMOGRAPHY | Facility: HOSPITAL | Age: 59
Discharge: HOME | End: 2024-03-12
Admitting: NURSE PRACTITIONER
Payer: COMMERCIAL

## 2024-03-12 DIAGNOSIS — Z12.31 ENCOUNTER FOR SCREENING MAMMOGRAM FOR MALIGNANT NEOPLASM OF BREAST: ICD-10-CM

## 2024-03-12 PROCEDURE — 77063 BREAST TOMOSYNTHESIS BI: CPT

## 2024-03-12 PROCEDURE — 77067 SCR MAMMO BI INCL CAD: CPT

## 2024-03-23 DIAGNOSIS — F33.1 MODERATE EPISODE OF RECURRENT MAJOR DEPRESSIVE DISORDER: ICD-10-CM

## 2024-03-23 DIAGNOSIS — G47.09 OTHER INSOMNIA: ICD-10-CM

## 2024-03-25 RX ORDER — TRAZODONE HYDROCHLORIDE 100 MG/1
TABLET ORAL
Qty: 45 TABLET | Refills: 2 | Status: SHIPPED | OUTPATIENT
Start: 2024-03-25

## 2024-03-25 RX ORDER — BUPROPION HYDROCHLORIDE 150 MG/1
150 TABLET ORAL EVERY MORNING
Qty: 30 TABLET | Refills: 2 | Status: SHIPPED | OUTPATIENT
Start: 2024-03-25 | End: 2024-03-26

## 2024-03-25 NOTE — TELEPHONE ENCOUNTER
Sent thru interface, Patient needs follow up appointment last one was cancelled by Patient thru interface

## 2024-03-26 ENCOUNTER — OFFICE VISIT (OUTPATIENT)
Dept: PSYCHIATRY | Facility: CLINIC | Age: 59
End: 2024-03-26
Payer: COMMERCIAL

## 2024-03-26 VITALS
OXYGEN SATURATION: 98 % | DIASTOLIC BLOOD PRESSURE: 84 MMHG | SYSTOLIC BLOOD PRESSURE: 118 MMHG | HEART RATE: 82 BPM | WEIGHT: 232 LBS | HEIGHT: 66 IN | BODY MASS INDEX: 37.28 KG/M2

## 2024-03-26 DIAGNOSIS — F41.1 GENERALIZED ANXIETY DISORDER: Primary | ICD-10-CM

## 2024-03-26 DIAGNOSIS — G47.09 OTHER INSOMNIA: ICD-10-CM

## 2024-03-26 DIAGNOSIS — F33.0 MILD EPISODE OF RECURRENT MAJOR DEPRESSIVE DISORDER: ICD-10-CM

## 2024-03-26 RX ORDER — SEMAGLUTIDE 1.34 MG/ML
0.25 INJECTION, SOLUTION SUBCUTANEOUS
COMMUNITY
Start: 2024-03-11

## 2024-03-26 RX ORDER — OLOPATADINE HYDROCHLORIDE AND MOMETASONE FUROATE 25; 665 UG/1; UG/1
SPRAY, METERED NASAL
COMMUNITY
Start: 2024-01-30 | End: 2024-03-26

## 2024-03-26 NOTE — PROGRESS NOTES
Subjective   Ana Samaniego is a 58 y.o. female who presents today for follow up    Chief Complaint: Depression, anxiety and panic attacks    History of Present Illness:   History of Present Illness  Ana Samaniego presents today for medication management follow-up.  Currently taking Prozac 40 mg daily and trazodone 100 mg nightly.  Tapered Abilify to discontinue and was prescribed Wellbutrin XL at last visit.  Says that she did not start Wellbutrin as she felt overall mood had improved.  Voices that she is doing well overall and mood is well controlled.  Denies any current bothersome symptoms associated with depression.  Experiences situational anxiety on occasion, but feels as though symptoms are only minimal. The cough she was experiencing last visit has decreased in severity over the past week after unplugging her scented plug-ins.  Has continued to take allergy medication as prescribed.  Sleeping well, wakes during the night but only because the dog needs to go outside.  Reports appetite is good, but decreased.  Has 5 pound weight loss noted per [facility] since last visit on 1/25/2024. Started Ozempic about 2 weeks ago due to elevated A1c.  Denies SI/HI.  PHQ-9 total Score: 2, ES-7 total score: 0.    Previous Psych Meds: Reports having tried several past psychiatric medications, but verbalizes she does not recall specific names.  Abilify (not effective)     The following portions of the patient's history were reviewed and updated as appropriate: allergies, current medications, past family history, past medical history, past social history, past surgical history and problem list.      Past Medical History:  Past Medical History:   Diagnosis Date    Abnormal Pap smear of cervix     Acid reflux     Anxiety     Asthma, extrinsic     Bronchitis     Depression     Diabetes     GERD (gastroesophageal reflux disease)     Gestational diabetes     Gestational hypertension     HPV (human papilloma virus) infection      Hypertension     Pleurisy        Social History:  Social History     Socioeconomic History    Marital status:    Tobacco Use    Smoking status: Former     Current packs/day: 0.00     Average packs/day: 1 pack/day for 20.0 years (20.0 ttl pk-yrs)     Types: Cigarettes     Start date: 1992     Quit date: 2012     Years since quittin.7     Passive exposure: Past    Smokeless tobacco: Never   Vaping Use    Vaping status: Never Used   Substance and Sexual Activity    Alcohol use: Never    Drug use: Never    Sexual activity: Defer     Partners: Male     Birth control/protection: Surgical       Family History:  Family History   Problem Relation Age of Onset    Heart attack Mother     Emphysema Father        Past Surgical History:  Past Surgical History:   Procedure Laterality Date    COLONOSCOPY N/A 2017    Procedure: COLONOSCOPY;  Surgeon: Abdi Del Castillo MD;  Location: [facility] ENDOSCOPY;  Service:     GALLBLADDER SURGERY      HYSTERECTOMY      RECTOVAGINAL FISTULA REPAIR      TUBAL ABDOMINAL LIGATION      VEIN LIGATION AND STRIPPING Right        Problem List:  There is no problem list on file for this patient.      Allergy:   Allergies   Allergen Reactions    Sulfamethoxazole-Trimethoprim Unknown - Low Severity     Cannot tolerate        Current Medications:   Current Outpatient Medications   Medication Sig Dispense Refill    albuterol (PROVENTIL) (2.5 MG/3ML) 0.083% nebulizer solution Inhale 2.5 mg.      amLODIPine (NORVASC) 10 MG tablet       ARIPiprazole (ABILIFY) 10 MG tablet Take 1 tablet by mouth once daily 30 tablet 0    Breo Ellipta 100-25 MCG/ACT aerosol powder  Inhale 1 puff Daily.      Calcium Carbonate-Vitamin D (CALCIUM PLUS VITAMIN D PO) Take 1 tablet by mouth Daily.      clonazePAM (KlonoPIN) 0.5 MG tablet Take 1 tablet by mouth Daily As Needed for Anxiety. 15 tablet 0    diclofenac (VOLTAREN) 75 MG EC tablet Take 1 tablet by mouth 2 (Two) Times a Day.      FLUoxetine  "(PROzac) 40 MG capsule Take 1 capsule by mouth Daily. 30 capsule 2    fluticasone (FLONASE) 50 MCG/ACT nasal spray INHALE 1 SPRAY IN EACH NOSTRIL ONE TIME A DAY-USES PRN  2    Jardiance 25 MG tablet tablet Take 1 tablet by mouth Daily.      levocetirizine (XYZAL) 5 MG tablet Take 1 tablet by mouth Daily.      loratadine (CLARITIN) 10 MG tablet Take 1 tablet by mouth Daily.      methocarbamol (ROBAXIN) 750 MG tablet Take 1 tablet by mouth Daily.      montelukast (SINGULAIR) 10 MG tablet Take 1 tablet by mouth Daily.      omeprazole (priLOSEC) 20 MG capsule Take 1 capsule by mouth Every Morning.      PROAIR  (90 BASE) MCG/ACT inhaler INHALE 2 PUFFS BY MOUTH FOUR TIMES A DAY AS NEEDED  2    Semaglutide,0.25 or 0.5MG/DOS, (Ozempic, 0.25 or 0.5 MG/DOSE,) 2 MG/1.5ML solution pen-injector Inject 0.25 mg under the skin into the appropriate area as directed Every 7 (Seven) Days.      traZODone (DESYREL) 100 MG tablet TAKE 1 & 1/2 (ONE & ONE-HALF) TABLETS BY MOUTH AT BEDTIME 45 tablet 2     No current facility-administered medications for this visit.     Review of Systems   Constitutional:  Negative for activity change, appetite change, unexpected weight gain and unexpected weight loss.   Respiratory:  Negative for shortness of breath.    Cardiovascular:  Negative for chest pain.   Psychiatric/Behavioral:  Positive for dysphoric mood and sleep disturbance. Negative for suicidal ideas. The patient is nervous/anxious.      Physical Exam  Vitals reviewed.   Constitutional:       General: She is not in acute distress.     Appearance: Normal appearance.   Neurological:      Mental Status: She is alert.      Gait: Gait normal.     Vitals:   Blood pressure 118/84, pulse 82, height 167.6 cm (66\"), weight 105 kg (232 lb), SpO2 98%, not currently breastfeeding.    Mental Status Exam:   Hygiene:   good  Cooperation:  Cooperative  Eye Contact:  Good  Psychomotor Behavior:  Appropriate  Affect:  Appropriate  Mood: " normal  Hopelessness: Denies  Speech:  Normal  Thought Process:  Goal directed and Linear  Thought Content:  Mood congruent  Suicidal:  None  Homicidal:  None  Hallucinations:  None  Delusion:  None  Memory:  Intact  Orientation:  Person, Place, Time, and Situation  Reliability:  good  Insight:  Good  Judgement:  Good  Impulse Control:  Good    Lab Results:   No visits with results within 6 Month(s) from this visit.   Latest known visit with results is:   Lab on 01/13/2023   Component Date Value Ref Range Status    THC, Screen, Urine 01/13/2023 Negative  Negative Final    Phencyclidine (PCP), Urine 01/13/2023 Negative  Negative Final    Cocaine Screen, Urine 01/13/2023 Negative  Negative Final    Methamphetamine, Ur 01/13/2023 Negative  Negative Final    Opiate Screen 01/13/2023 Negative  Negative Final    Amphetamine Screen, Urine 01/13/2023 Negative  Negative Final    Benzodiazepine Screen, Urine 01/13/2023 Negative  Negative Final    Tricyclic Antidepressants Screen 01/13/2023 Negative  Negative Final    Methadone Screen, Urine 01/13/2023 Negative  Negative Final    Barbiturates Screen, Urine 01/13/2023 Negative  Negative Final    Oxycodone Screen, Urine 01/13/2023 Negative  Negative Final    Propoxyphene Screen 01/13/2023 Negative  Negative Final    Buprenorphine, Screen, Urine 01/13/2023 Negative  Negative Final       EKG Results:  No orders to display       Assessment & Plan   Problems Addressed this Visit    None  Visit Diagnoses       Generalized anxiety disorder    -  Primary    Mild episode of recurrent major depressive disorder        Other insomnia              Diagnoses         Codes Comments    Generalized anxiety disorder    -  Primary ICD-10-CM: F41.1  ICD-9-CM: 300.02     Mild episode of recurrent major depressive disorder     ICD-10-CM: F33.0  ICD-9-CM: 296.31     Other insomnia     ICD-10-CM: G47.09  ICD-9-CM: 780.52             Visit Diagnoses:    ICD-10-CM ICD-9-CM   1. Generalized anxiety  disorder  F41.1 300.02   2. Mild episode of recurrent major depressive disorder  F33.0 296.31   3. Other insomnia  G47.09 780.52     Ana Cavanaugh presents today for medication management follow-up.  Tapered Abilify last visit as a discussed, did not initiate Wellbutrin as she felt her mood had improved without starting medication.  Voices that overall mood is well controlled, denies any bothersome symptoms associated with anxiety or depression.  She does experience situational anxiety, but symptoms have been manageable.  Voices interest in continuing with current medications as previously prescribed.  We will continue with Prozac 40 mg daily and trazodone 100 mg nightly.  Also has clonazepam 0.5 mg daily as needed for severe anxiety, last Rx obtained 7/28/2023 per Alanna.  Denies any adverse effects of current medication regimen.    -Continue Prozac 40 mg daily for anxiety and depression  -Continue trazodone 100 mg at night for depression and sleep  -Continue clonazepam 0.5 mg daily as needed for anxiety #15 provided for 30-day supply (no refill needed at this time, last prescription filled 7/28/2023 per Alanna)     -Reviewed previous available documentation and most recent available labs.   ALANNA reviewed and is appropriate.      GOALS:  Short Term Goals: Patient will be compliant with medication, and patient will have no significant medication related side effects.  Patient will be engaged in psychotherapy as indicated.  Patient will report subjective improvement of symptoms.  Long term goals: To stabilize mood and treat/improve subjective symptoms, the patient will stay out of the hospital, the patient will be at an optimal level of functioning, and the patient will take all medications as prescribed.  The patient/guardian verbalized understanding and agreement with goals that were mutually set.    TREATMENT PLAN: Continue supportive psychotherapy efforts and medications as indicated for patient's diagnosis.   Pharmacological and Non-Pharmacological treatment options discussed during today's visit. Patient/Guardian acknowledged and verbally consented with current treatment plan and was educated on the importance of compliance with treatment and follow-up appointments.      MEDICATION ISSUES:  Discussed medication options and treatment plan of prescribed medication as well as the risks, benefits, any black box warnings, and side effects including potential falls, possible impaired driving, and metabolic adversities among others. Patient is agreeable to call the office with any worsening of symptoms or onset of side effects, or if any concerns or questions arise.  The contact information for the office is made available to the patient. Patient is agreeable to call 911 or go to the nearest ER should they begin having any SI/HI, or if any urgent concerns arise. No medication side effects or related complaints today.     MEDS ORDERED DURING VISIT:  No orders of the defined types were placed in this encounter.      FOLLOW UP:  Return in about 4 months (around 7/26/2024) for Recheck.             This document has been electronically signed by SALOMÓN Choi  March 26, 2024 11:29 EDT    Please note that portions of this note were completed with a voice recognition program. Efforts were made to edit dictation, but occasionally words are mistranscribed.

## 2024-06-18 ENCOUNTER — OFFICE VISIT (OUTPATIENT)
Dept: OBSTETRICS AND GYNECOLOGY | Facility: CLINIC | Age: 59
End: 2024-06-18
Payer: COMMERCIAL

## 2024-06-18 VITALS
DIASTOLIC BLOOD PRESSURE: 74 MMHG | HEIGHT: 66 IN | WEIGHT: 239 LBS | BODY MASS INDEX: 38.41 KG/M2 | SYSTOLIC BLOOD PRESSURE: 110 MMHG

## 2024-06-18 DIAGNOSIS — N81.11 CYSTOCELE, MIDLINE: ICD-10-CM

## 2024-06-18 DIAGNOSIS — Z01.419 WELL WOMAN EXAM WITH ROUTINE GYNECOLOGICAL EXAM: Primary | ICD-10-CM

## 2024-06-18 DIAGNOSIS — N95.2 VAGINAL ATROPHY: ICD-10-CM

## 2024-06-18 DIAGNOSIS — N39.3 SUI (STRESS URINARY INCONTINENCE, FEMALE): ICD-10-CM

## 2024-06-18 PROCEDURE — 99396 PREV VISIT EST AGE 40-64: CPT | Performed by: PHYSICIAN ASSISTANT

## 2024-06-18 PROCEDURE — 99213 OFFICE O/P EST LOW 20 MIN: CPT | Performed by: PHYSICIAN ASSISTANT

## 2024-06-18 RX ORDER — ESTRADIOL 0.1 MG/G
CREAM VAGINAL
Qty: 42.5 G | Refills: 4 | Status: SHIPPED | OUTPATIENT
Start: 2024-06-18

## 2024-06-18 RX ORDER — BUPROPION HYDROCHLORIDE 150 MG/1
150 TABLET ORAL EVERY MORNING
COMMUNITY
Start: 2024-04-20 | End: 2024-06-18

## 2024-06-18 RX ORDER — GLIPIZIDE 10 MG/1
10 TABLET, FILM COATED, EXTENDED RELEASE ORAL DAILY
COMMUNITY
Start: 2024-04-23

## 2024-06-18 NOTE — PROGRESS NOTES
Subjective   Chief Complaint   Patient presents with    Gynecologic Exam     No pap, Last pap done 22-WNL, MMG done 3/12/24-WNL.  Concerned about prolapse       Ana Samaniego is a 58 y.o. year old  presenting to be seen for her annual gynecological exam.   She is concerned about vaginal prolapse because of stress urine incontinence  States 3-4 months ago went through a long coughing spell and started having stress urine incontinence. Coughing resolved but still having stress incontinence. Has never used vaginal estrogen cream  Previous hysterectomy-retains ovaries  Having issues with vaginal dryness for quite some time      Past Medical History:   Diagnosis Date    Abnormal Pap smear of cervix     Acid reflux     Anxiety     Asthma, extrinsic     Bronchitis     Depression     Diabetes     GERD (gastroesophageal reflux disease)     Gestational diabetes     Gestational hypertension     HPV (human papilloma virus) infection     Hypertension     Pleurisy         Current Outpatient Medications:     albuterol (PROVENTIL) (2.5 MG/3ML) 0.083% nebulizer solution, Inhale 2.5 mg., Disp: , Rfl:     amLODIPine (NORVASC) 10 MG tablet, , Disp: , Rfl:     ARIPiprazole (ABILIFY) 10 MG tablet, Take 1 tablet by mouth once daily, Disp: 30 tablet, Rfl: 0    Breo Ellipta 100-25 MCG/ACT aerosol powder , Inhale 1 puff Daily., Disp: , Rfl:     Calcium Carbonate-Vitamin D (CALCIUM PLUS VITAMIN D PO), Take 1 tablet by mouth Daily., Disp: , Rfl:     diclofenac (VOLTAREN) 75 MG EC tablet, Take 1 tablet by mouth 2 (Two) Times a Day., Disp: , Rfl:     FLUoxetine (PROzac) 40 MG capsule, Take 1 capsule by mouth Daily., Disp: 30 capsule, Rfl: 2    fluticasone (FLONASE) 50 MCG/ACT nasal spray, INHALE 1 SPRAY IN EACH NOSTRIL ONE TIME A DAY-USES PRN, Disp: , Rfl: 2    glipizide (GLUCOTROL XL) 10 MG 24 hr tablet, Take 1 tablet by mouth Daily., Disp: , Rfl:     Jardiance 25 MG tablet tablet, Take 1 tablet by mouth Daily., Disp: , Rfl:      levocetirizine (XYZAL) 5 MG tablet, Take 1 tablet by mouth Daily., Disp: , Rfl:     loratadine (CLARITIN) 10 MG tablet, Take 1 tablet by mouth Daily., Disp: , Rfl:     methocarbamol (ROBAXIN) 750 MG tablet, Take 1 tablet by mouth Daily., Disp: , Rfl:     montelukast (SINGULAIR) 10 MG tablet, Take 1 tablet by mouth Daily., Disp: , Rfl:     omeprazole (priLOSEC) 20 MG capsule, Take 1 capsule by mouth Every Morning., Disp: , Rfl:     PROAIR  (90 BASE) MCG/ACT inhaler, INHALE 2 PUFFS BY MOUTH FOUR TIMES A DAY AS NEEDED, Disp: , Rfl: 2    traZODone (DESYREL) 100 MG tablet, TAKE 1 & 1/2 (ONE & ONE-HALF) TABLETS BY MOUTH AT BEDTIME, Disp: 45 tablet, Rfl: 2    estradiol (ESTRACE VAGINAL) 0.1 MG/GM vaginal cream, Insert 1 gm intravaginally 2 times each week, Disp: 42.5 g, Rfl: 4   Allergies   Allergen Reactions    Sulfamethoxazole-Trimethoprim Unknown - Low Severity     Cannot tolerate      Past Surgical History:   Procedure Laterality Date    COLONOSCOPY N/A 2017    Procedure: COLONOSCOPY;  Surgeon: Abdi Del Castillo MD;  Location: [facility] ENDOSCOPY;  Service:     GALLBLADDER SURGERY      HYSTERECTOMY      RECTOVAGINAL FISTULA REPAIR      TUBAL ABDOMINAL LIGATION      VEIN LIGATION AND STRIPPING Right       Social History     Socioeconomic History    Marital status:    Tobacco Use    Smoking status: Former     Current packs/day: 0.00     Average packs/day: 1 pack/day for 20.0 years (20.0 ttl pk-yrs)     Types: Cigarettes     Start date: 1992     Quit date: 2012     Years since quittin.0     Passive exposure: Past    Smokeless tobacco: Never   Vaping Use    Vaping status: Never Used   Substance and Sexual Activity    Alcohol use: Never    Drug use: Never    Sexual activity: Defer     Partners: Male     Birth control/protection: Surgical      Family History   Problem Relation Age of Onset    Heart attack Mother     Emphysema Father        Review of Systems   Constitutional:  Negative for  "chills, diaphoresis and fever.   Gastrointestinal: Negative.    Genitourinary:  Positive for enuresis. Negative for difficulty urinating, dysuria, flank pain, hematuria and pelvic pain.           Objective   /74   Ht 167.6 cm (66\")   Wt 108 kg (239 lb)   BMI 38.58 kg/m²     Physical Exam  Exam conducted with a chaperone present.   Constitutional:       Appearance: Normal appearance. She is well-developed and well-groomed. She is obese.   Eyes:      General: Lids are normal.      Extraocular Movements: Extraocular movements intact.      Conjunctiva/sclera: Conjunctivae normal.   Chest:   Breasts:     Breasts are symmetrical.      Right: No inverted nipple, mass, nipple discharge, skin change or tenderness.      Left: No inverted nipple, mass, nipple discharge, skin change or tenderness.   Abdominal:      General: Abdomen is protuberant.      Palpations: Abdomen is soft.      Tenderness: There is no abdominal tenderness.   Genitourinary:     Labia:         Right: No rash, tenderness or lesion.         Left: No rash, tenderness or lesion.       Urethra: No prolapse, urethral pain, urethral swelling or urethral lesion.      Vagina: No vaginal discharge, tenderness or lesions.      Uterus: Absent.       Adnexa:         Right: No mass or tenderness.          Left: No mass or tenderness.        Comments: Minimal Grade 1 cystocele  Significant incontinence with cough observed  Vaginal mucosa atrophic  Lymphadenopathy:      Upper Body:      Right upper body: No axillary adenopathy.      Left upper body: No axillary adenopathy.   Neurological:      Mental Status: She is alert.   Psychiatric:         Attention and Perception: Attention normal.         Mood and Affect: Mood normal. Mood is not anxious.         Speech: Speech normal.         Behavior: Behavior is cooperative.            Result Review :           Mammo Screening Digital Tomosynthesis Bilateral With CAD (03/12/2024 14:52)         Assessment and " Plan  Diagnoses and all orders for this visit:    1. Well woman exam with routine gynecological exam (Primary)    2. ADARSH (stress urinary incontinence, female)  -     Ambulatory Referral to Urology    3. Cystocele, midline    4. Vaginal atrophy    Other orders  -     estradiol (ESTRACE VAGINAL) 0.1 MG/GM vaginal cream; Insert 1 gm intravaginally 2 times each week  Dispense: 42.5 g; Refill: 4      Patient Instructions   Self breast exam monthly  Annual mammogram  Calcium 1200 mg daily and vit D 2000 mg daily  Regular excercise            This note was electronically signed.    Sailaja Heller PA-C   June 18, 2024

## 2024-07-23 DIAGNOSIS — F41.1 GENERALIZED ANXIETY DISORDER: ICD-10-CM

## 2024-07-23 DIAGNOSIS — F33.0 MILD EPISODE OF RECURRENT MAJOR DEPRESSIVE DISORDER: ICD-10-CM

## 2024-07-23 RX ORDER — FLUOXETINE HYDROCHLORIDE 40 MG/1
40 CAPSULE ORAL DAILY
Qty: 30 CAPSULE | Refills: 2 | Status: SHIPPED | OUTPATIENT
Start: 2024-07-23

## 2024-10-11 ENCOUNTER — HOSPITAL ENCOUNTER (EMERGENCY)
Facility: HOSPITAL | Age: 59
Discharge: HOME | End: 2024-10-11
Attending: STUDENT IN AN ORGANIZED HEALTH CARE EDUCATION/TRAINING PROGRAM
Payer: COMMERCIAL

## 2024-10-11 ENCOUNTER — APPOINTMENT (OUTPATIENT)
Dept: GENERAL RADIOLOGY | Facility: HOSPITAL | Age: 59
End: 2024-10-11
Payer: COMMERCIAL

## 2024-10-11 VITALS
HEIGHT: 66 IN | RESPIRATION RATE: 18 BRPM | TEMPERATURE: 98 F | HEART RATE: 76 BPM | SYSTOLIC BLOOD PRESSURE: 126 MMHG | DIASTOLIC BLOOD PRESSURE: 83 MMHG | OXYGEN SATURATION: 91 % | WEIGHT: 239 LBS | BODY MASS INDEX: 38.41 KG/M2

## 2024-10-11 DIAGNOSIS — E11.65 POORLY CONTROLLED DIABETES MELLITUS: ICD-10-CM

## 2024-10-11 DIAGNOSIS — R53.83 OTHER FATIGUE: Primary | ICD-10-CM

## 2024-10-11 DIAGNOSIS — E11.65 HYPERGLYCEMIA DUE TO DIABETES MELLITUS: ICD-10-CM

## 2024-10-11 LAB
ALBUMIN SERPL-MCNC: 3.9 G/DL (ref 3.5–5.2)
ALBUMIN/GLOB SERPL: 1.4 G/DL
ALP SERPL-CCNC: 77 U/L (ref 39–117)
ALT SERPL W P-5'-P-CCNC: 31 U/L (ref 1–33)
ANION GAP SERPL CALCULATED.3IONS-SCNC: 10.8 MMOL/L (ref 5–15)
AST SERPL-CCNC: 27 U/L (ref 1–32)
BASOPHILS # BLD AUTO: 0.04 10*3/MM3 (ref 0–0.2)
BASOPHILS NFR BLD AUTO: 0.8 % (ref 0–1.5)
BILIRUB SERPL-MCNC: 0.9 MG/DL (ref 0–1.2)
BUN SERPL-MCNC: 14 MG/DL (ref 6–20)
BUN/CREAT SERPL: 18.9 (ref 7–25)
CALCIUM SPEC-SCNC: 9 MG/DL (ref 8.6–10.5)
CHLORIDE SERPL-SCNC: 99 MMOL/L (ref 98–107)
CO2 SERPL-SCNC: 25.2 MMOL/L (ref 22–29)
CREAT SERPL-MCNC: 0.74 MG/DL (ref 0.57–1)
DEPRECATED RDW RBC AUTO: 40.2 FL (ref 37–54)
EGFRCR SERPLBLD CKD-EPI 2021: 93.9 ML/MIN/1.73
EOSINOPHIL # BLD AUTO: 0.11 10*3/MM3 (ref 0–0.4)
EOSINOPHIL NFR BLD AUTO: 2.1 % (ref 0.3–6.2)
ERYTHROCYTE [DISTWIDTH] IN BLOOD BY AUTOMATED COUNT: 12.7 % (ref 12.3–15.4)
GLOBULIN UR ELPH-MCNC: 2.7 GM/DL
GLUCOSE SERPL-MCNC: 222 MG/DL (ref 65–99)
HBA1C MFR BLD: 8.2 % (ref 4.8–5.6)
HCT VFR BLD AUTO: 42.5 % (ref 34–46.6)
HGB BLD-MCNC: 13.9 G/DL (ref 12–15.9)
HOLD SPECIMEN: NORMAL
HOLD SPECIMEN: NORMAL
IMM GRANULOCYTES # BLD AUTO: 0.02 10*3/MM3 (ref 0–0.05)
IMM GRANULOCYTES NFR BLD AUTO: 0.4 % (ref 0–0.5)
LYMPHOCYTES # BLD AUTO: 1.25 10*3/MM3 (ref 0.7–3.1)
LYMPHOCYTES NFR BLD AUTO: 24.3 % (ref 19.6–45.3)
MAGNESIUM SERPL-MCNC: 1.9 MG/DL (ref 1.6–2.6)
MCH RBC QN AUTO: 28.6 PG (ref 26.6–33)
MCHC RBC AUTO-ENTMCNC: 32.7 G/DL (ref 31.5–35.7)
MCV RBC AUTO: 87.4 FL (ref 79–97)
MONOCYTES # BLD AUTO: 0.44 10*3/MM3 (ref 0.1–0.9)
MONOCYTES NFR BLD AUTO: 8.5 % (ref 5–12)
NEUTROPHILS NFR BLD AUTO: 3.29 10*3/MM3 (ref 1.7–7)
NEUTROPHILS NFR BLD AUTO: 63.9 % (ref 42.7–76)
NRBC BLD AUTO-RTO: 0 /100 WBC (ref 0–0.2)
PLATELET # BLD AUTO: 201 10*3/MM3 (ref 140–450)
PMV BLD AUTO: 10.5 FL (ref 6–12)
POTASSIUM SERPL-SCNC: 4.3 MMOL/L (ref 3.5–5.2)
PROT SERPL-MCNC: 6.6 G/DL (ref 6–8.5)
RBC # BLD AUTO: 4.86 10*6/MM3 (ref 3.77–5.28)
SODIUM SERPL-SCNC: 135 MMOL/L (ref 136–145)
TROPONIN T SERPL HS-MCNC: <6 NG/L
WBC NRBC COR # BLD AUTO: 5.15 10*3/MM3 (ref 3.4–10.8)
WHOLE BLOOD HOLD COAG: NORMAL
WHOLE BLOOD HOLD SPECIMEN: NORMAL

## 2024-10-11 PROCEDURE — 83735 ASSAY OF MAGNESIUM: CPT | Performed by: STUDENT IN AN ORGANIZED HEALTH CARE EDUCATION/TRAINING PROGRAM

## 2024-10-11 PROCEDURE — 80053 COMPREHEN METABOLIC PANEL: CPT | Performed by: STUDENT IN AN ORGANIZED HEALTH CARE EDUCATION/TRAINING PROGRAM

## 2024-10-11 PROCEDURE — 83036 HEMOGLOBIN GLYCOSYLATED A1C: CPT | Performed by: PHYSICIAN ASSISTANT

## 2024-10-11 PROCEDURE — 93005 ELECTROCARDIOGRAM TRACING: CPT | Performed by: STUDENT IN AN ORGANIZED HEALTH CARE EDUCATION/TRAINING PROGRAM

## 2024-10-11 PROCEDURE — 85025 COMPLETE CBC W/AUTO DIFF WBC: CPT | Performed by: STUDENT IN AN ORGANIZED HEALTH CARE EDUCATION/TRAINING PROGRAM

## 2024-10-11 PROCEDURE — 96374 THER/PROPH/DIAG INJ IV PUSH: CPT

## 2024-10-11 PROCEDURE — 99284 EMERGENCY DEPT VISIT MOD MDM: CPT

## 2024-10-11 PROCEDURE — 25010000002 ONDANSETRON PER 1 MG: Performed by: PHYSICIAN ASSISTANT

## 2024-10-11 PROCEDURE — 84484 ASSAY OF TROPONIN QUANT: CPT | Performed by: STUDENT IN AN ORGANIZED HEALTH CARE EDUCATION/TRAINING PROGRAM

## 2024-10-11 PROCEDURE — 71045 X-RAY EXAM CHEST 1 VIEW: CPT

## 2024-10-11 RX ORDER — SODIUM CHLORIDE 0.9 % (FLUSH) 0.9 %
10 SYRINGE (ML) INJECTION AS NEEDED
Status: DISCONTINUED | OUTPATIENT
Start: 2024-10-11 | End: 2024-10-11 | Stop reason: HOSPADM

## 2024-10-11 RX ORDER — ONDANSETRON 2 MG/ML
4 INJECTION INTRAMUSCULAR; INTRAVENOUS ONCE
Status: COMPLETED | OUTPATIENT
Start: 2024-10-11 | End: 2024-10-11

## 2024-10-11 RX ADMIN — ONDANSETRON 4 MG: 2 INJECTION INTRAMUSCULAR; INTRAVENOUS at 13:19

## 2024-10-11 NOTE — DISCHARGE INSTRUCTIONS
Rest.  Plenty of sugar free fluids.  Keep log of your sugars and call your PCP for follow up.  Continue current meds.  Call for follow up with [facility] as well.  Return to ER if any acute concerns.  Watch your diet to avoid foods high in carbohydrates (sugars).

## 2024-10-11 NOTE — ED PROVIDER NOTES
"Subjective   History of Present Illness  Ms. Samaniego is a 59-year-old female with history of type 2 diabetes (glipizide) and GERD, who presents to the emergency department with a 1 month history of elevated blood sugars and mild dizziness.  She states that she often feels \"exhausted\".  She denies any pain per se.  No fevers.  No nausea or vomiting.  No diarrhea.  Normal urination.  She states that she has been compliant with her medications.  She notes that her blood sugar yesterday was 367 and when she checked it again today it was 220.          Review of Systems   Constitutional:  Positive for fatigue. Negative for appetite change, chills and fever.   HENT:  Negative for sore throat.    Respiratory:  Negative for cough and shortness of breath.    Cardiovascular:  Negative for chest pain.   Gastrointestinal:  Negative for abdominal pain, diarrhea, nausea and vomiting.   Endocrine: Negative for polydipsia, polyphagia and polyuria.   Genitourinary:  Negative for dysuria.   Musculoskeletal:  Negative for myalgias.   Skin:  Negative for pallor and rash.   Neurological:  Positive for weakness (Generalized). Negative for headaches.   Psychiatric/Behavioral:  Negative for confusion.        Past Medical History:   Diagnosis Date    Abnormal Pap smear of cervix     Acid reflux     Anxiety     Asthma, extrinsic     Bronchitis     Depression     Diabetes     GERD (gastroesophageal reflux disease)     Gestational diabetes     Gestational hypertension     HPV (human papilloma virus) infection     Hypertension     Pleurisy        Allergies   Allergen Reactions    Sulfamethoxazole-Trimethoprim Unknown - Low Severity     Cannot tolerate       Past Surgical History:   Procedure Laterality Date    COLONOSCOPY N/A 06/28/2017    Procedure: COLONOSCOPY;  Surgeon: Abdi Del Castillo MD;  Location: [facility] ENDOSCOPY;  Service:     GALLBLADDER SURGERY      HYSTERECTOMY      RECTOVAGINAL FISTULA REPAIR      TUBAL ABDOMINAL LIGATION      " VEIN LIGATION AND STRIPPING Right        Family History   Problem Relation Age of Onset    Heart attack Mother     Emphysema Father        Social History     Socioeconomic History    Marital status:    Tobacco Use    Smoking status: Former     Current packs/day: 0.00     Average packs/day: 1 pack/day for 20.0 years (20.0 ttl pk-yrs)     Types: Cigarettes     Start date: 1992     Quit date: 2012     Years since quittin.3     Passive exposure: Past    Smokeless tobacco: Never   Vaping Use    Vaping status: Never Used   Substance and Sexual Activity    Alcohol use: Never    Drug use: Never    Sexual activity: Defer     Partners: Male     Birth control/protection: Surgical           Objective   Physical Exam  Constitutional:       General: She is not in acute distress.     Appearance: Normal appearance. She is not ill-appearing, toxic-appearing or diaphoretic.   HENT:      Nose: Nose normal.      Mouth/Throat:      Mouth: Mucous membranes are moist.   Eyes:      General: No scleral icterus.     Conjunctiva/sclera: Conjunctivae normal.      Pupils: Pupils are equal, round, and reactive to light.   Cardiovascular:      Rate and Rhythm: Normal rate and regular rhythm.      Pulses: Normal pulses.      Heart sounds: Normal heart sounds.   Pulmonary:      Effort: Pulmonary effort is normal.      Breath sounds: Normal breath sounds.   Abdominal:      General: Bowel sounds are normal.      Tenderness: There is no abdominal tenderness. There is no guarding.   Musculoskeletal:      Cervical back: Normal range of motion and neck supple.      Right lower leg: No edema.      Left lower leg: No edema.   Skin:     General: Skin is warm and dry.      Coloration: Skin is not jaundiced or pale.      Findings: No rash.   Neurological:      Mental Status: She is alert and oriented to person, place, and time.   Psychiatric:         Mood and Affect: Mood normal.         Procedures           ED Course  ED Course as of  10/11/24 1443   Fri Oct 11, 2024   1333 EKG interpreted by me, normal sinus rhythm no concerning ST changes noted, rate of 76 [JE]      ED Course User Index  [JE] Andrew Oconnor MD   In summary, 59-year-old female with history of non-insulin-dependent diabetes presents to the emergency department with 1 month history of elevated blood sugars and fatigue.  The patient denies any pain.  She has had mild nausea but no vomiting.  No abdominal pain.  Normal urination.  She states that she checked her blood sugar yesterday it was 367.  She rechecked it today and it was 220.  She states that she has been compliant with her glipizide.  She notes that she had been on Jardiance previously but it was discontinued due to side effects.    MDM: Differential includes hyperglycemia, electrolyte abnormality, doubt DKA, anemia, etc.    Labs are relatively bland except for a glucose of 222.  Creatinine is normal at 0.74.  No concerning electrolytes.  Normal white count at 5.15.  No anemia.    Hemoglobin A1c is 8.2.    High-sensitivity troponin is less than 6.    EKG read by me shows a sinus rhythm with a rate of 76.    Chest x-ray shows no active disease.    I spoke with the patient about her workup.  Given the national shortage of IV fluids, she was not given any IV saline.  She was given water to drink.  I encouraged her to continue to hydrate with sugar-free drinks and to keep a log of her blood sugars and follow-up with her PCP.  I will also refer her to endocrinology for further evaluation.                                       Medical Decision Making  Amount and/or Complexity of Data Reviewed  Labs: ordered.  Radiology: ordered.  ECG/medicine tests: ordered.    Risk  Prescription drug management.        Final diagnoses:   Other fatigue   Poorly controlled diabetes mellitus   Hyperglycemia due to diabetes mellitus       ED Disposition  ED Disposition       ED Disposition   Discharge    Condition   Stable    Comment   --                Kellen Murrell, APRN  [address] [facility] [address]  [phone]      Call for follow-up    [facility] END[facility]CRIN[facility]  [address] [address] [phone]  Schedule an appointment as soon as possible for a visit            Medication List      No changes were made to your prescriptions during this visit.            Pierre Conway, PA  10/11/24 1444

## 2024-10-23 DIAGNOSIS — F33.0 MILD EPISODE OF RECURRENT MAJOR DEPRESSIVE DISORDER: ICD-10-CM

## 2024-10-23 DIAGNOSIS — F41.1 GENERALIZED ANXIETY DISORDER: ICD-10-CM

## 2024-10-23 RX ORDER — FLUOXETINE 40 MG/1
40 CAPSULE ORAL DAILY
Qty: 30 CAPSULE | Refills: 0 | Status: SHIPPED | OUTPATIENT
Start: 2024-10-23

## 2024-10-23 NOTE — TELEPHONE ENCOUNTER
Refill request sent thru interface, please advise when Patient needs a follow up appointment, last seen on 03/26/2024.

## 2025-02-12 ENCOUNTER — TRANSCRIBE ORDERS (OUTPATIENT)
Dept: ADMINISTRATIVE | Facility: HOSPITAL | Age: 60
End: 2025-02-12
Payer: COMMERCIAL

## 2025-02-12 DIAGNOSIS — Z12.31 SCREENING MAMMOGRAM FOR BREAST CANCER: Primary | ICD-10-CM

## 2025-05-16 ENCOUNTER — HOSPITAL ENCOUNTER (OUTPATIENT)
Dept: MAMMOGRAPHY | Facility: HOSPITAL | Age: 60
Discharge: HOME | End: 2025-05-16
Admitting: FAMILY MEDICINE
Payer: COMMERCIAL

## 2025-05-16 DIAGNOSIS — Z12.31 SCREENING MAMMOGRAM FOR BREAST CANCER: ICD-10-CM

## 2025-05-16 PROCEDURE — 77067 SCR MAMMO BI INCL CAD: CPT

## 2025-05-16 PROCEDURE — 77063 BREAST TOMOSYNTHESIS BI: CPT

## 2025-06-25 ENCOUNTER — APPOINTMENT (OUTPATIENT)
Dept: GENERAL RADIOLOGY | Facility: HOSPITAL | Age: 60
End: 2025-06-25
Payer: COMMERCIAL

## 2025-06-25 ENCOUNTER — HOSPITAL ENCOUNTER (EMERGENCY)
Facility: HOSPITAL | Age: 60
Discharge: HOME | End: 2025-06-25
Attending: EMERGENCY MEDICINE | Admitting: EMERGENCY MEDICINE
Payer: COMMERCIAL

## 2025-06-25 VITALS
DIASTOLIC BLOOD PRESSURE: 71 MMHG | HEART RATE: 76 BPM | TEMPERATURE: 98.1 F | HEIGHT: 66 IN | OXYGEN SATURATION: 99 % | BODY MASS INDEX: 36.16 KG/M2 | RESPIRATION RATE: 18 BRPM | SYSTOLIC BLOOD PRESSURE: 132 MMHG | WEIGHT: 225 LBS

## 2025-06-25 DIAGNOSIS — R07.9 CHEST PAIN, UNSPECIFIED TYPE: Primary | ICD-10-CM

## 2025-06-25 LAB
ALBUMIN SERPL-MCNC: 3.8 G/DL (ref 3.5–5.2)
ALBUMIN/GLOB SERPL: 1.4 G/DL
ALP SERPL-CCNC: 81 U/L (ref 39–117)
ALT SERPL W P-5'-P-CCNC: 21 U/L (ref 1–33)
ANION GAP SERPL CALCULATED.3IONS-SCNC: 10.6 MMOL/L (ref 5–15)
AST SERPL-CCNC: 18 U/L (ref 1–32)
BASOPHILS # BLD AUTO: 0.03 10*3/MM3 (ref 0–0.2)
BASOPHILS NFR BLD AUTO: 0.5 % (ref 0–1.5)
BILIRUB SERPL-MCNC: 0.4 MG/DL (ref 0–1.2)
BUN SERPL-MCNC: 18 MG/DL (ref 6–20)
BUN/CREAT SERPL: 27.7 (ref 7–25)
CALCIUM SPEC-SCNC: 9.5 MG/DL (ref 8.6–10.5)
CHLORIDE SERPL-SCNC: 102 MMOL/L (ref 98–107)
CO2 SERPL-SCNC: 24.4 MMOL/L (ref 22–29)
CREAT SERPL-MCNC: 0.65 MG/DL (ref 0.57–1)
DEPRECATED RDW RBC AUTO: 39.5 FL (ref 37–54)
EGFRCR SERPLBLD CKD-EPI 2021: 102.2 ML/MIN/1.73
EOSINOPHIL # BLD AUTO: 0.14 10*3/MM3 (ref 0–0.4)
EOSINOPHIL NFR BLD AUTO: 2.3 % (ref 0.3–6.2)
ERYTHROCYTE [DISTWIDTH] IN BLOOD BY AUTOMATED COUNT: 12.7 % (ref 12.3–15.4)
GEN 5 1HR TROPONIN T REFLEX: <6 NG/L
GLOBULIN UR ELPH-MCNC: 2.8 GM/DL
GLUCOSE SERPL-MCNC: 233 MG/DL (ref 65–99)
HCT VFR BLD AUTO: 39 % (ref 34–46.6)
HGB BLD-MCNC: 12.8 G/DL (ref 12–15.9)
HOLD SPECIMEN: NORMAL
HOLD SPECIMEN: NORMAL
IMM GRANULOCYTES # BLD AUTO: 0.04 10*3/MM3 (ref 0–0.05)
IMM GRANULOCYTES NFR BLD AUTO: 0.7 % (ref 0–0.5)
LYMPHOCYTES # BLD AUTO: 1.69 10*3/MM3 (ref 0.7–3.1)
LYMPHOCYTES NFR BLD AUTO: 27.8 % (ref 19.6–45.3)
MCH RBC QN AUTO: 28.1 PG (ref 26.6–33)
MCHC RBC AUTO-ENTMCNC: 32.8 G/DL (ref 31.5–35.7)
MCV RBC AUTO: 85.7 FL (ref 79–97)
MONOCYTES # BLD AUTO: 0.47 10*3/MM3 (ref 0.1–0.9)
MONOCYTES NFR BLD AUTO: 7.7 % (ref 5–12)
NEUTROPHILS NFR BLD AUTO: 3.7 10*3/MM3 (ref 1.7–7)
NEUTROPHILS NFR BLD AUTO: 61 % (ref 42.7–76)
NRBC BLD AUTO-RTO: 0 /100 WBC (ref 0–0.2)
PLATELET # BLD AUTO: 228 10*3/MM3 (ref 140–450)
PMV BLD AUTO: 10.5 FL (ref 6–12)
POTASSIUM SERPL-SCNC: 4.3 MMOL/L (ref 3.5–5.2)
PROT SERPL-MCNC: 6.6 G/DL (ref 6–8.5)
RBC # BLD AUTO: 4.55 10*6/MM3 (ref 3.77–5.28)
SODIUM SERPL-SCNC: 137 MMOL/L (ref 136–145)
TROPONIN T NUMERIC DELTA: NORMAL
TROPONIN T SERPL HS-MCNC: <6 NG/L
WBC NRBC COR # BLD AUTO: 6.07 10*3/MM3 (ref 3.4–10.8)
WHOLE BLOOD HOLD COAG: NORMAL
WHOLE BLOOD HOLD SPECIMEN: NORMAL

## 2025-06-25 PROCEDURE — 99284 EMERGENCY DEPT VISIT MOD MDM: CPT | Performed by: EMERGENCY MEDICINE

## 2025-06-25 PROCEDURE — 71045 X-RAY EXAM CHEST 1 VIEW: CPT

## 2025-06-25 PROCEDURE — 85025 COMPLETE CBC W/AUTO DIFF WBC: CPT | Performed by: EMERGENCY MEDICINE

## 2025-06-25 PROCEDURE — 93005 ELECTROCARDIOGRAM TRACING: CPT | Performed by: EMERGENCY MEDICINE

## 2025-06-25 PROCEDURE — 80053 COMPREHEN METABOLIC PANEL: CPT | Performed by: EMERGENCY MEDICINE

## 2025-06-25 PROCEDURE — 36415 COLL VENOUS BLD VENIPUNCTURE: CPT

## 2025-06-25 PROCEDURE — 84484 ASSAY OF TROPONIN QUANT: CPT | Performed by: EMERGENCY MEDICINE

## 2025-06-25 RX ORDER — SODIUM CHLORIDE 0.9 % (FLUSH) 0.9 %
10 SYRINGE (ML) INJECTION AS NEEDED
Status: DISCONTINUED | OUTPATIENT
Start: 2025-06-25 | End: 2025-06-25 | Stop reason: HOSPADM

## 2025-06-25 NOTE — ED PROVIDER NOTES
EMERGENCY DEPARTMENT ENCOUNTER    Pt Name: Ana Samaniego  MRN: [id number]  Pt :   1965  Room Number:    Date of encounter:  2025  PCP: Zaid Chapa APRN  ED Provider: Vinay Sage MD    Historian: Patient      HPI:  Chief Complaint   Patient presents with    Chest Pain          Context: Ana Samaniego is a 59 y.o. female who presents to the ED c/o chest pain, started while cleaning the house, associate with some shortness of breath, has had pain like this before.  Pain is gotten better now upon arrival to the ER.  Patient also reports that she has had fatigue for several months which is unchanged today.  Denies nausea, vomiting, sweating      PAST MEDICAL HISTORY  Past Medical History:   Diagnosis Date    Abnormal Pap smear of cervix     Acid reflux     Anxiety     Asthma, extrinsic     Bronchitis     Depression     Diabetes     GERD (gastroesophageal reflux disease)     Gestational diabetes     Gestational hypertension     HPV (human papilloma virus) infection     Hypertension     Pleurisy          PAST SURGICAL HISTORY  Past Surgical History:   Procedure Laterality Date    COLONOSCOPY N/A 2017    Procedure: COLONOSCOPY;  Surgeon: Abdi Del Castillo MD;  Location: [facility] ENDOSCOPY;  Service:     GALLBLADDER SURGERY      HYSTERECTOMY      RECTOVAGINAL FISTULA REPAIR      TUBAL ABDOMINAL LIGATION      VEIN LIGATION AND STRIPPING Right          FAMILY HISTORY  Family History   Problem Relation Age of Onset    Heart attack Mother     Emphysema Father     Breast cancer Neg Hx          SOCIAL HISTORY  Social History     Socioeconomic History    Marital status:    Tobacco Use    Smoking status: Former     Current packs/day: 0.00     Average packs/day: 1 pack/day for 20.0 years (20.0 ttl pk-yrs)     Types: Cigarettes     Start date: 1992     Quit date: 2012     Years since quittin.0     Passive exposure: Past    Smokeless tobacco: Never   Vaping Use     Vaping status: Never Used   Substance and Sexual Activity    Alcohol use: Never    Drug use: Never    Sexual activity: Defer     Partners: Male     Birth control/protection: Surgical         ALLERGIES  Sulfamethoxazole-trimethoprim        REVIEW OF SYSTEMS  Review of Systems   Constitutional:  Negative for chills and fever.   HENT:  Negative for sore throat and trouble swallowing.    Eyes:  Negative for pain and redness.   Respiratory:  Negative for cough and shortness of breath.    Cardiovascular:  Positive for chest pain. Negative for leg swelling.   Gastrointestinal:  Negative for abdominal pain, nausea and vomiting.   Genitourinary:  Negative for dysuria and urgency.   Musculoskeletal:  Negative for back pain and neck pain.   Skin:  Negative for rash and wound.   Neurological:  Negative for dizziness and weakness.        All systems reviewed and negative except for those discussed in HPI.       PHYSICAL EXAM    I have reviewed the triage vital signs and nursing notes.    ED Triage Vitals   Temp Heart Rate Resp BP SpO2   06/25/25 1535 06/25/25 1535 06/25/25 1535 06/25/25 1535 06/25/25 1535   98.1 °F (36.7 °C) 78 18 132/62 95 %      Temp src Heart Rate Source Patient Position BP Location FiO2 (%)   06/25/25 1535 06/25/25 1535 06/25/25 1537 06/25/25 1535 --   Oral Monitor Lying Left arm        Physical Exam  Constitutional:       Appearance: Normal appearance. She is not ill-appearing.   HENT:      Head: Normocephalic and atraumatic.      Right Ear: External ear normal.      Left Ear: External ear normal.      Nose: Nose normal.      Mouth/Throat:      Mouth: Mucous membranes are moist.      Pharynx: Oropharynx is clear.   Eyes:      Extraocular Movements: Extraocular movements intact.      Conjunctiva/sclera: Conjunctivae normal.      Pupils: Pupils are equal, round, and reactive to light.   Cardiovascular:      Rate and Rhythm: Normal rate and regular rhythm.      Pulses:           Radial pulses are 2+ on the  right side and 2+ on the left side.   Pulmonary:      Effort: Pulmonary effort is normal.      Breath sounds: Normal breath sounds.   Abdominal:      General: There is no distension.      Palpations: Abdomen is soft.      Tenderness: There is no abdominal tenderness.   Musculoskeletal:         General: No tenderness or deformity. Normal range of motion.      Cervical back: Normal range of motion and neck supple.      Right lower leg: No edema.      Left lower leg: No edema.   Skin:     General: Skin is warm and dry.      Capillary Refill: Capillary refill takes less than 2 seconds.   Neurological:      General: No focal deficit present.      Mental Status: She is alert and oriented to person, place, and time.            LAB RESULTS  Recent Results (from the past 24 hours)   Comprehensive Metabolic Panel    Collection Time: 06/25/25  3:32 PM    Specimen: Blood   Result Value Ref Range    Glucose 233 (H) 65 - 99 mg/dL    BUN 18.0 6.0 - 20.0 mg/dL    Creatinine 0.65 0.57 - 1.00 mg/dL    Sodium 137 136 - 145 mmol/L    Potassium 4.3 3.5 - 5.2 mmol/L    Chloride 102 98 - 107 mmol/L    CO2 24.4 22.0 - 29.0 mmol/L    Calcium 9.5 8.6 - 10.5 mg/dL    Total Protein 6.6 6.0 - 8.5 g/dL    Albumin 3.8 3.5 - 5.2 g/dL    ALT (SGPT) 21 1 - 33 U/L    AST (SGOT) 18 1 - 32 U/L    Alkaline Phosphatase 81 39 - 117 U/L    Total Bilirubin 0.4 0.0 - 1.2 mg/dL    Globulin 2.8 gm/dL    A/G Ratio 1.4 g/dL    BUN/Creatinine Ratio 27.7 (H) 7.0 - 25.0    Anion Gap 10.6 5.0 - 15.0 mmol/L    eGFR 102.2 >60.0 mL/min/1.73   High Sensitivity Troponin T    Collection Time: 06/25/25  3:32 PM    Specimen: Blood   Result Value Ref Range    HS Troponin T <6 <14 ng/L   Green Top (Gel)    Collection Time: 06/25/25  3:32 PM   Result Value Ref Range    Extra Tube Hold for add-ons.    Lavender Top    Collection Time: 06/25/25  3:32 PM   Result Value Ref Range    Extra Tube hold for add-on    Gold Top - SST    Collection Time: 06/25/25  3:32 PM   Result Value  Ref Range    Extra Tube Hold for add-ons.    Light Blue Top    Collection Time: 06/25/25  3:32 PM   Result Value Ref Range    Extra Tube Hold for add-ons.    CBC Auto Differential    Collection Time: 06/25/25  3:32 PM    Specimen: Blood   Result Value Ref Range    WBC 6.07 3.40 - 10.80 10*3/mm3    RBC 4.55 3.77 - 5.28 10*6/mm3    Hemoglobin 12.8 12.0 - 15.9 g/dL    Hematocrit 39.0 34.0 - 46.6 %    MCV 85.7 79.0 - 97.0 fL    MCH 28.1 26.6 - 33.0 pg    MCHC 32.8 31.5 - 35.7 g/dL    RDW 12.7 12.3 - 15.4 %    RDW-SD 39.5 37.0 - 54.0 fl    MPV 10.5 6.0 - 12.0 fL    Platelets 228 140 - 450 10*3/mm3    Neutrophil % 61.0 42.7 - 76.0 %    Lymphocyte % 27.8 19.6 - 45.3 %    Monocyte % 7.7 5.0 - 12.0 %    Eosinophil % 2.3 0.3 - 6.2 %    Basophil % 0.5 0.0 - 1.5 %    Immature Grans % 0.7 (H) 0.0 - 0.5 %    Neutrophils, Absolute 3.70 1.70 - 7.00 10*3/mm3    Lymphocytes, Absolute 1.69 0.70 - 3.10 10*3/mm3    Monocytes, Absolute 0.47 0.10 - 0.90 10*3/mm3    Eosinophils, Absolute 0.14 0.00 - 0.40 10*3/mm3    Basophils, Absolute 0.03 0.00 - 0.20 10*3/mm3    Immature Grans, Absolute 0.04 0.00 - 0.05 10*3/mm3    nRBC 0.0 0.0 - 0.2 /100 WBC   High Sensitivity Troponin T 1Hr    Collection Time: 06/25/25  4:50 PM    Specimen: Blood   Result Value Ref Range    HS Troponin T <6 <14 ng/L    Troponin T Numeric Delta         If labs were ordered, I independently reviewed the results and considered them in treating the patient.        RADIOLOGY  XR Chest 1 View  Result Date: 6/25/2025  PROCEDURE: XR CHEST 1 VW-  HISTORY: Chest Pain Triage Protocol  COMPARISON: October 11, 2024..  FINDINGS: The heart is normal in size. The lungs are clear. The mediastinum is unremarkable. There is no pneumothorax.  There are no acute osseous abnormalities. Apical lordotic positioning noted.      No acute cardiopulmonary process.     This report was signed and finalized on 6/25/2025 4:02 PM by Arianna Worthy MD.             PROCEDURES    Procedures    Interpretations    O2 Sat: The patient's oxygen saturation was 96% on Room Air.  This was independently interpreted by me as Normal    EKG: I reviewed and independently interpreted the EKG as sinus rhythm rate of 81 bpm, normal axis, Motival's, no ST elevation, no T wave inversions    Cardiac Monitoring: I reviewed and independently interpreted the Rhythm Strip as Normal Sinus rhythm rate of 79    Radiology: I ordered and independently reviewed the above noted radiographic studies.  I viewed images of Chest Xray which showed No pulmonary process per my independent interpretation. See radiologist's dictation for official interpretation.     HEART Score: 2       MEDICATIONS GIVEN IN ER    Medications   sodium chloride 0.9 % flush 10 mL (has no administration in time range)         MEDICAL DECISION MAKING, PROGRESS, and CONSULTS    All labs, if obtained, have been independently reviewed by me.  All radiology studies, if obtained, have been reviewed by me and the radiologist dictating the report.  All EKG's, if obtained, have been independently viewed and interpreted by me      Discussion below represents my analysis of pertinent findings related to patient's condition, differential diagnosis, treatment plan and final disposition.      Differential diagnosis:    59-year-old female presented ED with complaint of chest pain, she has a heart score of 3 prior to troponin being drawn, the pain was very atypical, and it resolved on its own.  She additionally has had fatigue for months, this could be ACS, but I think more likely noncardiac chest pain, other than age she is PERC and Wells negative I think PE is very unlikely in this patient    Additional Sources:  External (non-ED) record review:  Primary care note 5/7/2025 regarding chronic pain       Orders placed during this visit:  Orders Placed This Encounter   Procedures    XR Chest 1 View    [place] Draw    Comprehensive Metabolic  Panel    High Sensitivity Troponin T    CBC Auto Differential    High Sensitivity Troponin T 1Hr    Ambulatory Referral to Cardiology for Chest Pain    NPO Diet NPO Type: Strict NPO    Undress & Gown    Continuous Pulse Oximetry    Oxygen Therapy- Nasal Cannula; Titrate 1-6 LPM Per SpO2; 90 - 95%    ECG 12 Lead ED Triage Standing Order; Chest Pain    ECG 12 Lead ED Triage Standing Order; Chest Pain    Insert Peripheral IV    CBC & Differential    Green Top (Gel)    Lavender Top    Gold Top - SST    Light Blue Top         Additional orders considered but not ordered:  None    ED Course:    Consultants:  None    ED Course as of 06/25/25 1934 Wed Jun 25, 2025   1558 Comprehensive Metabolic Panel(!)  CMP is benign [CS]   1558 CBC & Differential(!)  CBC is benign [CS]   1604 High Sensitivity Troponin T  Initial troponin level is negative, will repeat per high sensitivity troponin protocol [CS]   1721 High Sensitivity Troponin T 1Hr  Repeat troponin similarly negative [CS]   1723 Patient has 2 negative troponins, her chest pain remains resolved,  [CS]   1724 Will discharge patient home with outpatient cardiology follow-up, patient voiced understanding is agreeable to plan [CS]      ED Course User Index  [CS] Vinay Sage MD           After my consideration of clinical presentation and any laboratory/radiology studies obtained, I discussed the findings with the patient/patient representative who is in agreement with the treatment plan and the final disposition. Risks and benefits of discharge were discussed.     AS OF 19:34 EDT VITALS:    BP - 132/71  HR - 76  TEMP - 98.1 °F (36.7 °C) (Oral)  O2 SATS - 99%    I reviewed the patient's prescription monitoring report if available prior to discharge    DIAGNOSIS  Final diagnoses:   Chest pain, unspecified type         DISPOSITION  ED Disposition       ED Disposition   Discharge    Condition   Stable    Comment   --                   Please note that portions of  this document were completed with voice recognition software.        Vinay Sage MD  06/25/25 [id number]

## 2025-07-04 ENCOUNTER — APPOINTMENT (OUTPATIENT)
Dept: GENERAL RADIOLOGY | Facility: HOSPITAL | Age: 60
End: 2025-07-04
Payer: COMMERCIAL

## 2025-07-04 ENCOUNTER — HOSPITAL ENCOUNTER (EMERGENCY)
Facility: HOSPITAL | Age: 60
Discharge: HOME | End: 2025-07-04
Attending: EMERGENCY MEDICINE
Payer: COMMERCIAL

## 2025-07-04 ENCOUNTER — APPOINTMENT (OUTPATIENT)
Dept: CT IMAGING | Facility: HOSPITAL | Age: 60
End: 2025-07-04
Payer: COMMERCIAL

## 2025-07-04 VITALS
DIASTOLIC BLOOD PRESSURE: 73 MMHG | OXYGEN SATURATION: 97 % | HEIGHT: 66 IN | TEMPERATURE: 98 F | WEIGHT: 224.87 LBS | RESPIRATION RATE: 17 BRPM | BODY MASS INDEX: 36.14 KG/M2 | HEART RATE: 86 BPM | SYSTOLIC BLOOD PRESSURE: 119 MMHG

## 2025-07-04 DIAGNOSIS — N30.00 ACUTE CYSTITIS WITHOUT HEMATURIA: ICD-10-CM

## 2025-07-04 DIAGNOSIS — R41.0 CONFUSION: Primary | ICD-10-CM

## 2025-07-04 LAB
ALBUMIN SERPL-MCNC: 4.1 G/DL (ref 3.5–5.2)
ALBUMIN/GLOB SERPL: 1.4 G/DL
ALP SERPL-CCNC: 87 U/L (ref 39–117)
ALT SERPL W P-5'-P-CCNC: 26 U/L (ref 1–33)
ANION GAP SERPL CALCULATED.3IONS-SCNC: 13.1 MMOL/L (ref 5–15)
AST SERPL-CCNC: 22 U/L (ref 1–32)
BACTERIA UR QL AUTO: ABNORMAL /HPF
BASOPHILS # BLD AUTO: 0.05 10*3/MM3 (ref 0–0.2)
BASOPHILS NFR BLD AUTO: 0.7 % (ref 0–1.5)
BILIRUB SERPL-MCNC: 0.5 MG/DL (ref 0–1.2)
BILIRUB UR QL STRIP: NEGATIVE
BUN SERPL-MCNC: 17 MG/DL (ref 6–20)
BUN/CREAT SERPL: 19.1 (ref 7–25)
CALCIUM SPEC-SCNC: 9.2 MG/DL (ref 8.6–10.5)
CHLORIDE SERPL-SCNC: 100 MMOL/L (ref 98–107)
CLARITY UR: ABNORMAL
CO2 SERPL-SCNC: 24.9 MMOL/L (ref 22–29)
COLOR UR: YELLOW
CREAT SERPL-MCNC: 0.89 MG/DL (ref 0.57–1)
CRP SERPL-MCNC: 1.46 MG/DL (ref 0–0.5)
D-LACTATE SERPL-SCNC: 1.8 MMOL/L (ref 0.5–2)
DEPRECATED RDW RBC AUTO: 40.1 FL (ref 37–54)
EGFRCR SERPLBLD CKD-EPI 2021: 75.3 ML/MIN/1.73
EOSINOPHIL # BLD AUTO: 0.17 10*3/MM3 (ref 0–0.4)
EOSINOPHIL NFR BLD AUTO: 2.3 % (ref 0.3–6.2)
ERYTHROCYTE [DISTWIDTH] IN BLOOD BY AUTOMATED COUNT: 12.8 % (ref 12.3–15.4)
GLOBULIN UR ELPH-MCNC: 2.9 GM/DL
GLUCOSE SERPL-MCNC: 182 MG/DL (ref 65–99)
GLUCOSE UR STRIP-MCNC: ABNORMAL MG/DL
HCT VFR BLD AUTO: 41.6 % (ref 34–46.6)
HGB BLD-MCNC: 13.4 G/DL (ref 12–15.9)
HGB UR QL STRIP.AUTO: ABNORMAL
HYALINE CASTS UR QL AUTO: ABNORMAL /LPF
IMM GRANULOCYTES # BLD AUTO: 0.03 10*3/MM3 (ref 0–0.05)
IMM GRANULOCYTES NFR BLD AUTO: 0.4 % (ref 0–0.5)
KETONES UR QL STRIP: NEGATIVE
LEUKOCYTE ESTERASE UR QL STRIP.AUTO: ABNORMAL
LYMPHOCYTES # BLD AUTO: 1.96 10*3/MM3 (ref 0.7–3.1)
LYMPHOCYTES NFR BLD AUTO: 26.2 % (ref 19.6–45.3)
MAGNESIUM SERPL-MCNC: 2.1 MG/DL (ref 1.6–2.6)
MCH RBC QN AUTO: 27.7 PG (ref 26.6–33)
MCHC RBC AUTO-ENTMCNC: 32.2 G/DL (ref 31.5–35.7)
MCV RBC AUTO: 86.1 FL (ref 79–97)
MONOCYTES # BLD AUTO: 0.59 10*3/MM3 (ref 0.1–0.9)
MONOCYTES NFR BLD AUTO: 7.9 % (ref 5–12)
NEUTROPHILS NFR BLD AUTO: 4.68 10*3/MM3 (ref 1.7–7)
NEUTROPHILS NFR BLD AUTO: 62.5 % (ref 42.7–76)
NITRITE UR QL STRIP: NEGATIVE
NRBC BLD AUTO-RTO: 0 /100 WBC (ref 0–0.2)
PH UR STRIP.AUTO: <=5 [PH] (ref 5–8)
PLATELET # BLD AUTO: 246 10*3/MM3 (ref 140–450)
PMV BLD AUTO: 10.6 FL (ref 6–12)
POTASSIUM SERPL-SCNC: 4.4 MMOL/L (ref 3.5–5.2)
PROT SERPL-MCNC: 7 G/DL (ref 6–8.5)
PROT UR QL STRIP: NEGATIVE
RBC # BLD AUTO: 4.83 10*6/MM3 (ref 3.77–5.28)
RBC # UR STRIP: ABNORMAL /HPF
REF LAB TEST METHOD: ABNORMAL
SODIUM SERPL-SCNC: 138 MMOL/L (ref 136–145)
SP GR UR STRIP: >=1.03 (ref 1–1.03)
SQUAMOUS #/AREA URNS HPF: ABNORMAL /HPF
TRANS CELLS #/AREA URNS HPF: ABNORMAL /HPF
TROPONIN T SERPL HS-MCNC: <6 NG/L
UROBILINOGEN UR QL STRIP: ABNORMAL
WBC # UR STRIP: ABNORMAL /HPF
WBC NRBC COR # BLD AUTO: 7.48 10*3/MM3 (ref 3.4–10.8)

## 2025-07-04 PROCEDURE — 25810000003 SODIUM CHLORIDE 0.9 % SOLUTION

## 2025-07-04 PROCEDURE — 25510000001 IOPAMIDOL 61 % SOLUTION: Performed by: EMERGENCY MEDICINE

## 2025-07-04 PROCEDURE — 86140 C-REACTIVE PROTEIN: CPT

## 2025-07-04 PROCEDURE — 81001 URINALYSIS AUTO W/SCOPE: CPT

## 2025-07-04 PROCEDURE — 93005 ELECTROCARDIOGRAM TRACING: CPT

## 2025-07-04 PROCEDURE — 85025 COMPLETE CBC W/AUTO DIFF WBC: CPT

## 2025-07-04 PROCEDURE — 83735 ASSAY OF MAGNESIUM: CPT

## 2025-07-04 PROCEDURE — 99285 EMERGENCY DEPT VISIT HI MDM: CPT | Performed by: EMERGENCY MEDICINE

## 2025-07-04 PROCEDURE — 70450 CT HEAD/BRAIN W/O DYE: CPT

## 2025-07-04 PROCEDURE — 87086 URINE CULTURE/COLONY COUNT: CPT

## 2025-07-04 PROCEDURE — 96365 THER/PROPH/DIAG IV INF INIT: CPT

## 2025-07-04 PROCEDURE — 80053 COMPREHEN METABOLIC PANEL: CPT

## 2025-07-04 PROCEDURE — 74177 CT ABD & PELVIS W/CONTRAST: CPT

## 2025-07-04 PROCEDURE — 84484 ASSAY OF TROPONIN QUANT: CPT

## 2025-07-04 PROCEDURE — 83605 ASSAY OF LACTIC ACID: CPT

## 2025-07-04 PROCEDURE — 25010000002 CEFTRIAXONE PER 250 MG

## 2025-07-04 PROCEDURE — 71045 X-RAY EXAM CHEST 1 VIEW: CPT

## 2025-07-04 RX ORDER — ORAL SEMAGLUTIDE 3 MG/1
1 TABLET ORAL DAILY
COMMUNITY

## 2025-07-04 RX ORDER — IOPAMIDOL 612 MG/ML
100 INJECTION, SOLUTION INTRAVASCULAR
Status: COMPLETED | OUTPATIENT
Start: 2025-07-04 | End: 2025-07-04

## 2025-07-04 RX ADMIN — CEFTRIAXONE 1000 MG: 1 INJECTION, POWDER, FOR SOLUTION INTRAMUSCULAR; INTRAVENOUS at 20:02

## 2025-07-04 RX ADMIN — SODIUM CHLORIDE 1000 ML: 9 INJECTION, SOLUTION INTRAVENOUS at 18:28

## 2025-07-04 RX ADMIN — IOPAMIDOL 100 ML: 612 INJECTION, SOLUTION INTRAVENOUS at 19:07

## 2025-07-04 NOTE — ED PROVIDER NOTES
EMERGENCY DEPARTMENT ENCOUNTER    Pt Name: Ana Samaniego  MRN: [id number]  Pt :   1965  Room Number:    Date of encounter:  2025  PCP: Zaid Chapa APRN  ED Provider: Cipriano Crawford PA-C    Historian: Patient, nursing notes, daughter at bedside      HPI:  Chief Complaint: Dysuria, confusion, intermittent dizziness        Context: Ana Samaniego is a 59 y.o. female who presents to the ED c/o dysuria for the past 24 hours.  Patient also reports for the past week or so she has felt off and on mild confusion and intermittent dizziness when moving around.  Patient denies current dizziness at this time, lightheadedness, vision changes, chest pain or shortness of breath, vomiting, bowel or bladder incontinence, urinary frequency or any other complaint.      PAST MEDICAL HISTORY  Past Medical History:   Diagnosis Date    Abnormal Pap smear of cervix     Acid reflux     Anxiety     Asthma, extrinsic     Bronchitis     Depression     Diabetes     GERD (gastroesophageal reflux disease)     Gestational diabetes     Gestational hypertension     HPV (human papilloma virus) infection     Hypertension     Pleurisy          PAST SURGICAL HISTORY  Past Surgical History:   Procedure Laterality Date    COLONOSCOPY N/A 2017    Procedure: COLONOSCOPY;  Surgeon: Abdi Del Castillo MD;  Location: [facility];  Service:     GALLBLADDER SURGERY      HYSTERECTOMY      RECTOVAGINAL FISTULA REPAIR      TUBAL ABDOMINAL LIGATION      VEIN LIGATION AND STRIPPING Right          FAMILY HISTORY  Family History   Problem Relation Age of Onset    Heart attack Mother     Emphysema Father     Breast cancer Neg Hx          SOCIAL HISTORY  Social History     Socioeconomic History    Marital status:    Tobacco Use    Smoking status: Former     Current packs/day: 0.00     Average packs/day: 1 pack/day for 20.0 years (20.0 ttl pk-yrs)     Types: Cigarettes     Start date: 1992     Quit date: 2012      Years since quittin.0     Passive exposure: Past    Smokeless tobacco: Never   Vaping Use    Vaping status: Never Used   Substance and Sexual Activity    Alcohol use: Never    Drug use: Never    Sexual activity: Defer     Partners: Male     Birth control/protection: Surgical         ALLERGIES  Sulfamethoxazole-trimethoprim        REVIEW OF SYSTEMS  Review of Systems   Constitutional:  Negative for chills and fever.   HENT:  Negative for congestion and sore throat.    Respiratory:  Negative for cough and shortness of breath.    Cardiovascular:  Negative for chest pain.   Gastrointestinal:  Negative for abdominal pain, nausea and vomiting.   Genitourinary:  Positive for dysuria.   Musculoskeletal:  Negative for back pain.   Skin:  Negative for wound.   Neurological:  Positive for dizziness. Negative for headaches.   Psychiatric/Behavioral:  Positive for confusion.    All other systems reviewed and are negative.         All systems reviewed and negative except for those discussed in HPI.       PHYSICAL EXAM    I have reviewed the triage vital signs and nursing notes.    ED Triage Vitals [25 1746]   Temp Heart Rate Resp BP SpO2   98 °F (36.7 °C) 105 17 136/72 96 %      Temp src Heart Rate Source Patient Position BP Location FiO2 (%)   Oral Monitor Sitting Left arm --       Physical Exam  Vitals and nursing note reviewed.   Constitutional:       General: She is not in acute distress.     Appearance: She is not ill-appearing, toxic-appearing or diaphoretic.   HENT:      Head: Normocephalic and atraumatic.      Mouth/Throat:      Mouth: Mucous membranes are moist.      Pharynx: Oropharynx is clear.   Eyes:      General: No visual field deficit.     Extraocular Movements: Extraocular movements intact.   Cardiovascular:      Rate and Rhythm: Normal rate.      Heart sounds: Normal heart sounds.   Pulmonary:      Effort: Pulmonary effort is normal. No respiratory distress.      Breath sounds: Normal breath  sounds. No wheezing or rales.   Abdominal:      Tenderness: There is abdominal tenderness.   Skin:     General: Skin is warm and dry.      Findings: No rash.   Neurological:      Mental Status: She is alert and oriented to person, place, and time.      GCS: GCS eye subscore is 4. GCS verbal subscore is 5. GCS motor subscore is 6.      Cranial Nerves: No cranial nerve deficit, dysarthria or facial asymmetry.      Sensory: No sensory deficit.      Motor: No weakness.      Coordination: Romberg sign negative.      Gait: Gait normal.             LAB RESULTS  Recent Results (from the past 24 hours)   Comprehensive Metabolic Panel    Collection Time: 07/04/25  6:24 PM    Specimen: Blood   Result Value Ref Range    Glucose 182 (H) 65 - 99 mg/dL    BUN 17.0 6.0 - 20.0 mg/dL    Creatinine 0.89 0.57 - 1.00 mg/dL    Sodium 138 136 - 145 mmol/L    Potassium 4.4 3.5 - 5.2 mmol/L    Chloride 100 98 - 107 mmol/L    CO2 24.9 22.0 - 29.0 mmol/L    Calcium 9.2 8.6 - 10.5 mg/dL    Total Protein 7.0 6.0 - 8.5 g/dL    Albumin 4.1 3.5 - 5.2 g/dL    ALT (SGPT) 26 1 - 33 U/L    AST (SGOT) 22 1 - 32 U/L    Alkaline Phosphatase 87 39 - 117 U/L    Total Bilirubin 0.5 0.0 - 1.2 mg/dL    Globulin 2.9 gm/dL    A/G Ratio 1.4 g/dL    BUN/Creatinine Ratio 19.1 7.0 - 25.0    Anion Gap 13.1 5.0 - 15.0 mmol/L    eGFR 75.3 >60.0 mL/min/1.73   High Sensitivity Troponin T    Collection Time: 07/04/25  6:24 PM    Specimen: Blood   Result Value Ref Range    HS Troponin T <6 <14 ng/L   Lactic Acid, Plasma    Collection Time: 07/04/25  6:24 PM    Specimen: Blood   Result Value Ref Range    Lactate 1.8 0.5 - 2.0 mmol/L   C-reactive Protein    Collection Time: 07/04/25  6:24 PM    Specimen: Blood   Result Value Ref Range    C-Reactive Protein 1.46 (H) 0.00 - 0.50 mg/dL   Magnesium    Collection Time: 07/04/25  6:24 PM    Specimen: Blood   Result Value Ref Range    Magnesium 2.1 1.6 - 2.6 mg/dL   CBC Auto Differential    Collection Time: 07/04/25  6:24 PM     Specimen: Blood   Result Value Ref Range    WBC 7.48 3.40 - 10.80 10*3/mm3    RBC 4.83 3.77 - 5.28 10*6/mm3    Hemoglobin 13.4 12.0 - 15.9 g/dL    Hematocrit 41.6 34.0 - 46.6 %    MCV 86.1 79.0 - 97.0 fL    MCH 27.7 26.6 - 33.0 pg    MCHC 32.2 31.5 - 35.7 g/dL    RDW 12.8 12.3 - 15.4 %    RDW-SD 40.1 37.0 - 54.0 fl    MPV 10.6 6.0 - 12.0 fL    Platelets 246 140 - 450 10*3/mm3    Neutrophil % 62.5 42.7 - 76.0 %    Lymphocyte % 26.2 19.6 - 45.3 %    Monocyte % 7.9 5.0 - 12.0 %    Eosinophil % 2.3 0.3 - 6.2 %    Basophil % 0.7 0.0 - 1.5 %    Immature Grans % 0.4 0.0 - 0.5 %    Neutrophils, Absolute 4.68 1.70 - 7.00 10*3/mm3    Lymphocytes, Absolute 1.96 0.70 - 3.10 10*3/mm3    Monocytes, Absolute 0.59 0.10 - 0.90 10*3/mm3    Eosinophils, Absolute 0.17 0.00 - 0.40 10*3/mm3    Basophils, Absolute 0.05 0.00 - 0.20 10*3/mm3    Immature Grans, Absolute 0.03 0.00 - 0.05 10*3/mm3    nRBC 0.0 0.0 - 0.2 /100 WBC   Urinalysis With Culture If Indicated - Urine, Clean Catch    Collection Time: 07/04/25  6:25 PM    Specimen: Urine, Clean Catch   Result Value Ref Range    Color, UA Yellow Yellow, Straw    Appearance, UA Turbid (A) Clear    pH, UA <=5.0 5.0 - 8.0    Specific Gravity, UA >=1.030 1.005 - 1.030    Glucose,  mg/dL (Trace) (A) Negative    Ketones, UA Negative Negative    Bilirubin, UA Negative Negative    Blood, UA Moderate (2+) (A) Negative    Protein, UA Negative Negative    Leuk Esterase, UA Moderate (2+) (A) Negative    Nitrite, UA Negative Negative    Urobilinogen, UA 0.2 E.U./dL 0.2 - 1.0 E.U./dL   Urinalysis, Microscopic Only - Urine, Clean Catch    Collection Time: 07/04/25  6:25 PM    Specimen: Urine, Clean Catch   Result Value Ref Range    RBC, UA 6-10 (A) None Seen, 0-2 /HPF    WBC, UA 21-50 (A) None Seen, 0-2 /HPF    Bacteria, UA 1+ (A) None Seen /HPF    Squamous Epithelial Cells, UA 0-2 None Seen, 0-2 /HPF    Transitional Epithelial Cells, UA 0-2 0 - 2 /HPF    Hyaline Casts, UA None Seen None Seen /LPF     Methodology Manual Light Microscopy        If labs were ordered, I independently reviewed the results and considered them in treating the patient.        RADIOLOGY  XR Chest 1 View  Result Date: 7/5/2025  PROCEDURE: XR CHEST 1 VW-  INDICATION:  Confusion, dizziness  FINDINGS:  A portable view of the chest was obtained.  Comparison is made to a prior exam dated 6/25/2025.   The cardiac and mediastinal silhouettes are within normal limits.  The lungs are clear.  There is no pleural effusion or pneumothorax.      No acute process on this portable exam.   This report was signed and finalized on 7/5/2025 8:03 AM by Cheryl Ramos MD.      CT Head Without Contrast  Result Date: 7/4/2025  FINAL REPORT TECHNIQUE: null CLINICAL HISTORY: Confusion, dizziness COMPARISON: null FINDINGS: CT head without contrast Comparison: None provided Findings: No intra-axial mass, midline shift, hydrocephalus, or acute hemorrhage. No significant atrophy-like change or white matter disease. There is no sinus or mastoid fluid. The orbits are unremarkable. There is no acute fracture.     IMPRESSION: 1. No acute intracranial findings. Authenticated and Electronically Signed by Harry Mendoza on 07/04/2025 07:51:37 PM    CT Abdomen Pelvis With Contrast  Result Date: 7/4/2025  FINAL REPORT TECHNIQUE: null CLINICAL HISTORY: Bilateral lower abdominal pain COMPARISON: null FINDINGS: CT abdomen and pelvis with contrast Comparison: None provided Findings: No consolidation or effusion. The gallbladder is surgically absent. The liver, spleen, pancreas and adrenal glands are normal. The kidneys are normal with no evidence of renal or ureteral calculi. No bowel obstruction, pneumoperitoneum, or pneumatosis. Moderate stool is seen throughout the colon. The appendix is normal. Pelvic contents unremarkable. The patient has had a hysterectomy. The bones are intact. Severe degenerative changes seen at L4-L5 and L5-S1 with broad-based disc bulging and severe  facet degeneration. These degenerative changes result in severe spinal canal stenosis at L4-L5 and mild spinal canal stenosis at L5-S1.     IMPRESSION: No acute findings with postsurgical and degenerative changes as above. Authenticated and Electronically Signed by Harry Mendoza on 07/04/2025 07:47:20 PM      I ordered and independently reviewed the above noted radiographic studies.      I viewed images of chest x-ray which showed no acute process per my independent interpretation.    I viewed images of CT head which showed no acute intracranial abnormality per my independent interpretation.    I viewed images of CT abdomen pelvis which showed no acute intra-abdominal findings per my independent interpretation.    See radiologist's dictation for official interpretation.        PROCEDURES    Procedures    ECG 12 Lead Other; Dizziness   Final Result          MEDICATIONS GIVEN IN ER    Medications   sodium chloride 0.9 % bolus 1,000 mL (0 mL Intravenous Stopped 7/4/25 2021)   iopamidol (ISOVUE-300) 61 % injection 100 mL (100 mL Intravenous Given 7/4/25 1907)   cefTRIAXone (ROCEPHIN) 1,000 mg in sodium chloride 0.9 % 100 mL IVPB-VTB (0 mg Intravenous Stopped 7/4/25 2021)         MEDICAL DECISION MAKING, PROGRESS, and CONSULTS    All labs, if obtained, have been independently reviewed by me.  All radiology studies, if obtained, have been reviewed by me and the radiologist dictating the report.  All EKG's, if obtained, have been independently viewed and interpreted by me/my attending physician.      Discussion below represents my analysis of pertinent findings related to patient's condition, differential diagnosis, treatment plan and final disposition.    Patient is a 59-year-old female presenting with multiple symptoms including generalized weakness, intermittent dizziness, fatigue.  Recently diagnosed with a urinary tract infection today and reportedly given a shot of antibiotics.  On evaluation the patient's vital signs  and pulse oximetry showed mild tachycardia with a rate of 105 which quickly normalized to a heart rate of 86 other vital signs stable per my independent interpretation.  She is upright alert and oriented x 4 she does not appear to have any AMS or confusion.  Physical exam otherwise reassuring however given patient's complaints, extensive imaging was obtained today, EKG also obtained showing no acute ischemic changes, troponins undetectable lowering any suspicion for ACS.  CBC revealing no leukocytosis normal H&H.  Lactic acid normal lowering any suspicion for mesenteric ischemia or sepsis.  CRP is mildly elevated at 1.46.  Chemistry is nonactionable.  Urinalysis is concerning for sure for urinary tract infection with 21-50 white blood cells and leukocyte esterase positive and 1+ bacteria.  Patient was therefore given 1 g IV Rocephin.  Urine culture sent to pharmacy.  Advised close follow-up with her primary care provider for repeat urinalysis check after taking Macrobid antibiotic which was prescribed today by urgent treatment center provider.  The reported confusion could be secondary to a UTI or due to the fact that the patient recently increased her dose of gabapentin medication prescribed by her rheumatologist yesterday.  Advise discussion with rheumatology provider regarding this confusion symptoms.  Strict ED return precautions were explained and the patient verbalized understanding of and agreement this plan of care.                       Differential diagnosis:    Differential diagnosis included but was not limited to UTI, dehydration, electrolyte derangement, arrhythmia, confusion, sepsis, pneumonia, pyelonephritis      Additional sources:    - Discussed/ obtained information from independent historians: Patient's daughter at bedside    - External (non-ED) record review: Previous medical records reviewed    - Chronic or social conditions impacting care: Fibromyalgia    Orders placed during this  visit:  Orders Placed This Encounter   Procedures    Urine Culture - Urine,    XR Chest 1 View    CT Head Without Contrast    CT Abdomen Pelvis With Contrast    Comprehensive Metabolic Panel    High Sensitivity Troponin T    Lactic Acid, Plasma    C-reactive Protein    Magnesium    CBC Auto Differential    Urinalysis With Culture If Indicated - Urine, Clean Catch    Urinalysis, Microscopic Only - Urine, Clean Catch    ECG 12 Lead Other; Dizziness    CBC & Differential         Additional orders considered but not ordered: None      ED Course:    Consultants: None    ED Course as of 07/05/25 1813 Fri Jul 04, 2025 1844   EKG Interpretation    Evaluated and interpreted by emergency department physician    Rhythm: Normal Sinus Rhythm  Rate: Normal  Axis: Tamia  Ectopy: none  Conduction: Normal  ST Segments: Normal  T Waves: Normal  Q Waves: None    Clinical Impression: Normal Sinus Rhythm    Vinay Alonzo DO   [CR]   1910 WBC, UA(!): 21-50 [TG]   1910 Leukocytes, UA(!): Moderate (2+) [TG]   2008 Patient was already prescribed Macrobid by [facility] she has this prescription in her possession it is prescribed 100 mg twice daily x 7 days, she is already taken 1 dose earlier today [TG]      ED Course User Index  [CR] Vinay Alonzo DO  [TG] Cipriano Crawford PA-C              Shared Decision Making:  After my consideration of clinical presentation and any laboratory/radiology studies obtained, I discussed the findings with the patient/patient representative who is in agreement with the treatment plan and the final disposition.   Risks and benefits of discharge and/or observation/admission were discussed.       AS OF 18:13 EDT VITALS:    BP - 119/73  HR - 86  TEMP - 98 °F (36.7 °C) (Oral)  O2 SATS - 97%                  DIAGNOSIS  Final diagnoses:   Confusion   Acute cystitis without hematuria         DISPOSITION  Discharge      Please note that portions of this document were completed with voice  recognition software.      Cipriano Crawford PA-C  07/05/25 8215

## 2025-07-05 NOTE — DISCHARGE INSTRUCTIONS
You have been evaluated today and have been found to have a significant urinary tract infection.  CT scans of your head, and your abdomen and pelvis were largely unremarkable.  Your blood work today does not reveal any indications for sepsis.  Your EKG and cardiac enzymes were reassuring with no evidence of heart event today.  We do suspect that your mild confusion symptoms could be related to your urinary tract infection and/or related to your recent increase in dose of your gabapentin medications.  Return to the ER immediately for any acute changes or worsening of your condition and we also recommend follow-up with your rheumatologist as well as with your primary care provider within the next several days for repeat urinalysis check to make sure your infection has resolved.  Take the medication that was prescribed to you by the urgent treatment center as prescribed.  We will contact you by phone if your urine culture results indicate we need to change your antibiotic.

## 2025-07-06 LAB — BACTERIA SPEC AEROBE CULT: NO GROWTH

## 2025-08-20 ENCOUNTER — OFFICE VISIT (OUTPATIENT)
Dept: OBSTETRICS AND GYNECOLOGY | Facility: CLINIC | Age: 60
End: 2025-08-20
Payer: COMMERCIAL

## 2025-08-20 VITALS
WEIGHT: 246 LBS | BODY MASS INDEX: 39.53 KG/M2 | HEIGHT: 66 IN | SYSTOLIC BLOOD PRESSURE: 120 MMHG | DIASTOLIC BLOOD PRESSURE: 72 MMHG

## 2025-08-20 DIAGNOSIS — Z12.72 VAGINAL PAP SMEAR: ICD-10-CM

## 2025-08-20 DIAGNOSIS — Z01.419 WOMEN'S ANNUAL ROUTINE GYNECOLOGICAL EXAMINATION: Primary | ICD-10-CM

## 2025-08-20 PROCEDURE — 99459 PELVIC EXAMINATION: CPT | Performed by: PHYSICIAN ASSISTANT

## 2025-08-20 PROCEDURE — 99396 PREV VISIT EST AGE 40-64: CPT | Performed by: PHYSICIAN ASSISTANT

## 2025-08-20 RX ORDER — DULOXETIN HYDROCHLORIDE 30 MG/1
30 CAPSULE, DELAYED RELEASE ORAL DAILY
COMMUNITY

## 2025-08-20 RX ORDER — DESVENLAFAXINE 25 MG/1
25 TABLET, EXTENDED RELEASE ORAL DAILY
COMMUNITY
Start: 2025-08-14 | End: 2025-09-13

## 2025-08-20 RX ORDER — BUSPIRONE HYDROCHLORIDE 10 MG/1
10 TABLET ORAL 3 TIMES DAILY
COMMUNITY
Start: 2025-07-21 | End: 2026-01-17

## 2025-08-20 RX ORDER — ASCORBIC ACID 500 MG
500 TABLET ORAL DAILY
COMMUNITY

## 2025-08-20 RX ORDER — GABAPENTIN 300 MG/1
1 CAPSULE ORAL EVERY 12 HOURS SCHEDULED
COMMUNITY
Start: 2025-05-07

## 2025-08-20 RX ORDER — TIRZEPATIDE 5 MG/.5ML
5 INJECTION, SOLUTION SUBCUTANEOUS WEEKLY
COMMUNITY
Start: 2025-05-07 | End: 2025-08-20

## 2025-08-20 RX ORDER — ERGOCALCIFEROL 1.25 MG/1
1 CAPSULE, LIQUID FILLED ORAL WEEKLY
COMMUNITY
Start: 2025-05-18

## 2025-08-20 RX ORDER — DULOXETIN HYDROCHLORIDE 60 MG/1
1 CAPSULE, DELAYED RELEASE ORAL DAILY
COMMUNITY
Start: 2025-07-03

## 2025-08-20 RX ORDER — CLONAZEPAM 0.5 MG/1
0.5 TABLET ORAL
COMMUNITY
Start: 2025-07-21

## 2025-08-20 RX ORDER — PREGABALIN 50 MG/1
CAPSULE ORAL
COMMUNITY

## 2025-08-21 LAB — REF LAB TEST METHOD: NORMAL

## (undated) DEVICE — ENDOGATOR AUXILIARY WATER JET CONNECTOR: Brand: ENDOGATOR

## (undated) DEVICE — Device